# Patient Record
Sex: FEMALE | Race: WHITE | NOT HISPANIC OR LATINO | Employment: FULL TIME | URBAN - NONMETROPOLITAN AREA
[De-identification: names, ages, dates, MRNs, and addresses within clinical notes are randomized per-mention and may not be internally consistent; named-entity substitution may affect disease eponyms.]

---

## 2018-11-27 ENCOUNTER — TRANSCRIBE ORDERS (OUTPATIENT)
Dept: PHYSICAL THERAPY | Facility: CLINIC | Age: 23
End: 2018-11-27

## 2018-11-27 ENCOUNTER — EVALUATION (OUTPATIENT)
Dept: PHYSICAL THERAPY | Facility: CLINIC | Age: 23
End: 2018-11-27
Payer: COMMERCIAL

## 2018-11-27 DIAGNOSIS — M65.9 TENOSYNOVITIS OF FOOT: Primary | ICD-10-CM

## 2018-11-27 DIAGNOSIS — M65.9 TENOSYNOVITIS OF LEFT FOOT: Primary | ICD-10-CM

## 2018-11-27 PROCEDURE — 97162 PT EVAL MOD COMPLEX 30 MIN: CPT | Performed by: PHYSICAL THERAPIST

## 2018-11-27 PROCEDURE — 97535 SELF CARE MNGMENT TRAINING: CPT | Performed by: PHYSICAL THERAPIST

## 2018-11-27 PROCEDURE — G8991 OTHER PT/OT GOAL STATUS: HCPCS | Performed by: PHYSICAL THERAPIST

## 2018-11-27 PROCEDURE — G8990 OTHER PT/OT CURRENT STATUS: HCPCS | Performed by: PHYSICAL THERAPIST

## 2018-11-27 PROCEDURE — 97110 THERAPEUTIC EXERCISES: CPT | Performed by: PHYSICAL THERAPIST

## 2018-11-27 NOTE — LETTER
2018    Wali Galeas Dpm  1101  [employer]  [address] [place] [address] Way [address]    Patient: Pradeep Allan   YOB: 1995   Date of Visit: 2018     Encounter Diagnosis     ICD-10-CM    1  Tenosynovitis of left foot M65 9        Dear Dr Severo Giovanni:    Please review the attached Plan of Care from [facility] recent visit  Please verify that you agree therapy should continue by signing the attached document and sending it back to our office  If you have any questions or concerns, please don't hesitate to call  Sincerely,    Ana Paula Wilkins, PT      Referring Provider:      I certify that I have read the below Plan of Care and certify the need for these services furnished under this plan of treatment while under my care  Wali Galeas Dpm  1101  "[employer] [address] [address]  VIA Facsimile: [phone]          PT Evaluation     Today's date: 2018  Patient name: Pradeep Allan  : 1995  MRN: [id number]  Referring provider: Vinayak Dobbs  Dx:   Encounter Diagnosis     ICD-10-CM    1  Tenosynovitis of left foot M65 9                   Assessment  Assessment details: Patient demonstrates decreased strength, rom, flexibility, balance/gait and increased pain limiting functional ability  Patient would benefit from PT intervention to address limitations in order to maximize functional independence  Impairments: abnormal gait, abnormal or restricted ROM, activity intolerance, impaired balance, impaired physical strength, lacks appropriate home exercise program, pain with function and weight-bearing intolerance    Goals  ST  Initiate HEP  2  Patient to report decreased pain at worst by 50% in 3 weeks    LT  Patient to increase left foot and ankle strength to 5/5 in 6 weeks  2  Patient to increase left foot and ankle rom and flexibility to [facility] in 6 weeks  3  Patient to report decreased pain at worst to 1-2/10 in 6 weeks  4   Patient to return to normal ambulation and balance in 6 weeks  5  Patient to be discharged to [facility] in 6 weeks    Plan  Patient would benefit from: skilled physical therapy  Planned modality interventions: cryotherapy, thermotherapy: hydrocollator packs, unattended electrical stimulation and ultrasound  Planned therapy interventions: joint mobilization, manual therapy, balance, balance/weight bearing training, neuromuscular re-education, patient education, strengthening, stretching, therapeutic activities, therapeutic exercise, therapeutic training, functional ROM exercises, flexibility, graded activity, graded exercise and home exercise program  Frequency: 2x week  Duration in visits: 8  Duration in weeks: 4  Treatment plan discussed with: patient        Subjective Evaluation    History of Present Illness  Date of onset: 8/27/2018  Mechanism of injury: Patient presents to PT with c/o pain in left foot  She reports the pain began about 3 months ago and she was diagnosed with a stress fracture  Patient has seen a foot doctor and she had injections which did not help  Patient reports she was placed in a walking boot for the past 6 weeks and notices improvement  She saw her doctor yesterday and she is to wean out of the walking boot now  Patient denies any known injury and reports the pain is in her 2nd and 3rd metatarsals and phalanges  Patient reports pain worsens when she is standing for longer periods of time  She RTD on December 10th and is now referred to oppt     Pain  At best pain rating: 3  At worst pain rating: 10  Quality: pressure  Relieving factors: relaxation, ice and medications  Aggravating factors: walking, standing and stair climbing    Treatments  Previous treatment: injection treatment  Patient Goals  Patient goals for therapy: decreased pain          Objective     Palpation     Additional Palpation Details  TTP noted at 2nd and 3rd metatarsal and phalanges on dorsal aspect of foot    Neurological Testing Sensation     Ankle/Foot   Left Ankle/Foot   Intact: light touch    Right Ankle/Foot   Intact: light touch     Active Range of Motion   Left Ankle/Foot   Dorsiflexion (ke): 4 degrees   Plantar flexion: WFL  Inversion: WFL  Eversion: WFL    Strength/Myotome Testing     Left Ankle/Foot   Dorsiflexion: 4  Plantar flexion: 4  Inversion: 4-  Eversion: 4-    Ambulation     Observational Gait   Gait: antalgic     Additional Observational Gait Details  Patient ambulating without walking boot this morning             Precautions: None    Daily Treatment Diary     Manual  11/27            Left foot/ankle stretching and PROM                                                                     Exercise Diary  11/27            [address] [facility] L3 10 min            TR/HR             1/4 Squats             BAPS Board             Self Calf Stretch with towel             Ankle ROM             Ankle PRE's with TB             MRE's             Towel Scrunch                                                                                                                                                                Modalities  11/27            CP Prn

## 2018-11-27 NOTE — PROGRESS NOTES
PT Evaluation     Today's date: 2018  Patient name: Stevie Alvarez  : 1995  MRN: [id number]  Referring provider: Myah Zhang  Dx:   Encounter Diagnosis     ICD-10-CM    1  Tenosynovitis of left foot M65 9                   Assessment  Assessment details: Patient demonstrates decreased strength, rom, flexibility, balance/gait and increased pain limiting functional ability  Patient would benefit from PT intervention to address limitations in order to maximize functional independence  Impairments: abnormal gait, abnormal or restricted ROM, activity intolerance, impaired balance, impaired physical strength, lacks appropriate home exercise program, pain with function and weight-bearing intolerance    Goals  ST  Initiate HEP  2  Patient to report decreased pain at worst by 50% in 3 weeks    LT  Patient to increase left foot and ankle strength to 5/5 in 6 weeks  2  Patient to increase left foot and ankle rom and flexibility to [facility] in 6 weeks  3  Patient to report decreased pain at worst to 1-2/10 in 6 weeks  4  Patient to return to normal ambulation and balance in 6 weeks  5   Patient to be discharged to [facility] in 6 weeks    Plan  Patient would benefit from: skilled physical therapy  Planned modality interventions: cryotherapy, thermotherapy: hydrocollator packs, unattended electrical stimulation and ultrasound  Planned therapy interventions: joint mobilization, manual therapy, balance, balance/weight bearing training, neuromuscular re-education, patient education, strengthening, stretching, therapeutic activities, therapeutic exercise, therapeutic training, functional ROM exercises, flexibility, graded activity, graded exercise and home exercise program  Frequency: 2x week  Duration in visits: 8  Duration in weeks: 4  Treatment plan discussed with: patient        Subjective Evaluation    History of Present Illness  Date of onset: 2018  Mechanism of injury: Patient presents to PT with c/o pain in left foot  She reports the pain began about 3 months ago and she was diagnosed with a stress fracture  Patient has seen a foot doctor and she had injections which did not help  Patient reports she was placed in a walking boot for the past 6 weeks and notices improvement  She saw her doctor yesterday and she is to wean out of the walking boot now  Patient denies any known injury and reports the pain is in her 2nd and 3rd metatarsals and phalanges  Patient reports pain worsens when she is standing for longer periods of time  She RTD on December 10th and is now referred to oppt  Pain  At best pain rating: 3  At worst pain rating: 10  Quality: pressure  Relieving factors: relaxation, ice and medications  Aggravating factors: walking, standing and stair climbing    Treatments  Previous treatment: injection treatment  Patient Goals  Patient goals for therapy: decreased pain          Objective     Palpation     Additional Palpation Details  TTP noted at 2nd and 3rd metatarsal and phalanges on dorsal aspect of foot    Neurological Testing     Sensation     Ankle/Foot   Left Ankle/Foot   Intact: light touch    Right Ankle/Foot   Intact: light touch     Active Range of Motion   Left Ankle/Foot   Dorsiflexion (ke): 4 degrees   Plantar flexion: WFL  Inversion: WFL  Eversion: WFL    Strength/Myotome Testing     Left Ankle/Foot   Dorsiflexion: 4  Plantar flexion: 4  Inversion: 4-  Eversion: 4-    Ambulation     Observational Gait   Gait: antalgic     Additional Observational Gait Details  Patient ambulating without walking boot this morning             Precautions: None    Daily Treatment Diary     Manual  11/27            Left foot/ankle stretching and PROM                                                                     Exercise Diary  11/27            [address] [facility] L3 10 min            TR/HR             1/4 Squats             BAPS Board             Self Calf Stretch with towel             Ankle ROM             Ankle PRE's with TB             MRE's             Towel Scrunch                                                                                                                                                                Modalities  11/27            CP Prn

## 2018-11-29 ENCOUNTER — OFFICE VISIT (OUTPATIENT)
Dept: PHYSICAL THERAPY | Facility: CLINIC | Age: 23
End: 2018-11-29
Payer: COMMERCIAL

## 2018-11-29 DIAGNOSIS — M65.9 TENOSYNOVITIS OF LEFT FOOT: Primary | ICD-10-CM

## 2018-11-29 PROCEDURE — 97140 MANUAL THERAPY 1/> REGIONS: CPT | Performed by: PHYSICAL THERAPIST

## 2018-11-29 PROCEDURE — 97110 THERAPEUTIC EXERCISES: CPT | Performed by: PHYSICAL THERAPIST

## 2018-11-29 NOTE — PROGRESS NOTES
Daily Note     Today's date: 2018  Patient name: Nahid Chavez  : 1995  MRN: [id number]  Referring provider: Christine Valadez  Dx:   Encounter Diagnosis     ICD-10-CM    1  Tenosynovitis of left foot M65 9                   Subjective: "The top of my toes are what is hurting the most"  Objective: See treatment diary below  Precautions: None     Daily Treatment Diary      Manual                     Left foot/ankle stretching and PROM   15 min                                                                                                                         Exercise Diary                     [address] [place] [address] L3 10 min L3 10 min                   TR/HR   2x10                   1/4 Squats                       BAPS Board   2x10                   Self Calf Stretch with towel   4x15"                   Ankle ROM   2x10                   Ankle PRE's with TB                       MRE's   10x                   Towel Scrunch   2x10                                                                                                                                                                                                                                                                                                 Modalities                     CP Prn   Declined                                                                         Assessment: Patient with good tolerance to first treatment today  Minimal VC's required for correct performance of therex  Weakness noted t/o left foot and ankle  Plan: Continue per plan of care

## 2018-12-04 ENCOUNTER — OFFICE VISIT (OUTPATIENT)
Dept: PHYSICAL THERAPY | Facility: CLINIC | Age: 23
End: 2018-12-04
Payer: COMMERCIAL

## 2018-12-04 DIAGNOSIS — M65.9 TENOSYNOVITIS OF LEFT FOOT: Primary | ICD-10-CM

## 2018-12-04 PROCEDURE — 97110 THERAPEUTIC EXERCISES: CPT | Performed by: PHYSICAL THERAPIST

## 2018-12-04 PROCEDURE — 97140 MANUAL THERAPY 1/> REGIONS: CPT | Performed by: PHYSICAL THERAPIST

## 2018-12-04 NOTE — PROGRESS NOTES
Daily Note     Today's date: 2018  Patient name: Savannah Xiong  : 1995  MRN: [id number]  Referring provider: Tanika Alberts  Dx:   Encounter Diagnosis     ICD-10-CM    1  Tenosynovitis of left foot M65 9                   Subjective: Patient states "My foot is pretty sore  I am wearing the boot a lot less"  Objective: See treatment diary below  Precautions: None     Daily Treatment Diary      Manual  11/27 11/29 12/3                 Left foot/ankle stretching and PROM   15 min 15 min                                                                                                                       Exercise Diary  11/27 11/29 12/3                 [address] [place] [address] L3 10 min L3 10 min L3 10 min                 TR/HR   2x10 2x10                 1/4 Squats                       BAPS Board   2x10 2x10                 Self Calf Stretch with towel   4x15" 4x15"                 Ankle ROM   2x10 2x10                 Ankle PRE's with TB                       MRE's   10x 2x10                 Towel Scrunch   2x10 2x10                                                                                                                                                                                                                                                                                               Modalities                     CP Prn   Declined                                                                         Assessment: Patient with good tolerance to treatment today  Patient with improved rom noted t/o left ankle and toes  Ankle weakness remains  Plan: Continue per plan of care

## 2018-12-06 ENCOUNTER — OFFICE VISIT (OUTPATIENT)
Dept: PHYSICAL THERAPY | Facility: CLINIC | Age: 23
End: 2018-12-06
Payer: COMMERCIAL

## 2018-12-06 DIAGNOSIS — M65.9 TENOSYNOVITIS OF LEFT FOOT: Primary | ICD-10-CM

## 2018-12-06 PROCEDURE — 97110 THERAPEUTIC EXERCISES: CPT | Performed by: PHYSICAL THERAPIST

## 2018-12-06 PROCEDURE — 97140 MANUAL THERAPY 1/> REGIONS: CPT | Performed by: PHYSICAL THERAPIST

## 2018-12-06 NOTE — PROGRESS NOTES
Daily Note     Today's date: 2018  Patient name: Kelsey Garrett  : 1995  MRN: [id number]  Referring provider: Amauri Flores  Dx:   Encounter Diagnosis     ICD-10-CM    1  Tenosynovitis of left foot M65 9                   Subjective: Patient states "I tried to get out the boot but I had a lot of trouble yesterday"  Objective: See treatment diary below  Precautions: None     Daily Treatment Diary      Manual  11/27 11/29 12/3 12/6               Left foot/ankle stretching and PROM   15 min 15 min 15 min                                                                                                                     Exercise Diary  11/27 11/29 12/3 12/6               [address] [facility] L3 10 min L3 10 min L3 10 min L3 10 min               TR/HR   2x10 2x10 2x10               1/4 Squats                       BAPS Board   2x10 2x10 2x10               Self Calf Stretch with towel   4x15" 4x15" 4x15"               Ankle ROM   2x10 2x10 2x10               Ankle PRE's with TB                       MRE's   10x 2x10 2x10               Towel Scrunch   2x10 2x10 2x10                                                                                                                                                                                                                                                                                             Modalities                     CP Prn   Declined                                                                         Assessment: Patient with good tolerance to treatment today  Flexibility continues to improve  Patient advised to use boot on occasion and as needed as pain and activity level increase  Plan: Continue per plan of care

## 2018-12-11 ENCOUNTER — OFFICE VISIT (OUTPATIENT)
Dept: PHYSICAL THERAPY | Facility: CLINIC | Age: 23
End: 2018-12-11
Payer: COMMERCIAL

## 2018-12-11 DIAGNOSIS — M65.9 TENOSYNOVITIS OF LEFT FOOT: Primary | ICD-10-CM

## 2018-12-11 PROCEDURE — 97140 MANUAL THERAPY 1/> REGIONS: CPT | Performed by: PHYSICAL THERAPIST

## 2018-12-11 PROCEDURE — 97110 THERAPEUTIC EXERCISES: CPT | Performed by: PHYSICAL THERAPIST

## 2018-12-11 PROCEDURE — G8992 OTHER PT/OT  D/C STATUS: HCPCS | Performed by: PHYSICAL THERAPIST

## 2018-12-11 PROCEDURE — G8991 OTHER PT/OT GOAL STATUS: HCPCS | Performed by: PHYSICAL THERAPIST

## 2018-12-11 NOTE — PROGRESS NOTES
Daily Note     Today's date: 2018  Patient name: Javier Finch  : 1995  MRN: [id number]  Referring provider: Rosaura Cote  Dx:   Encounter Diagnosis     ICD-10-CM    1  Tenosynovitis of left foot M65 9                   Subjective: "My foot still gets sore but it isn't as bad as it was  I was at the doctor yesterday and she gave me a steroid pack"  Objective: See treatment diary below  Precautions: None     Daily Treatment Diary      Manual  11/27 11/29 12/3 12/6 12/11             Left foot/ankle stretching and PROM   15 min 15 min 15 min 15 min                                                                                                                   Exercise Diary  11/27 11/29 12/3 12/6 12/11             [address] [facility] L3 10 min L3 10 min L3 10 min L3 10 min L3 10 min             TR/HR   2x10 2x10 2x10 2x10             1/4 Squats                       BAPS Board   2x10 2x10 2x10 2x10             Self Calf Stretch with towel   4x15" 4x15" 4x15" 4x15"             Ankle ROM   2x10 2x10 2x10 2x10             Ankle PRE's with TB                       MRE's   10x 2x10 2x10 2x10             Towel Scrunch   2x10 2x10 2x10 2x10                                                                                                                                                                                                                                                                                           Modalities                     CP Prn   Declined                                                                         Assessment: Patient with good tolerance to treatment today  PT notes improving strength and rom t/o left foot and ankle today  Less tenderness noted over tarsal bones  Plan: Continue per plan of care

## 2018-12-13 ENCOUNTER — APPOINTMENT (OUTPATIENT)
Dept: PHYSICAL THERAPY | Facility: CLINIC | Age: 23
End: 2018-12-13
Payer: COMMERCIAL

## 2019-01-21 NOTE — PROGRESS NOTES
Patient seen for 5 total visits of PT  Last visit 12/11/18  Patient cancelled/DNS for next 2 scheduled appointments  No further contact from patient to reschedule  Patient to be discharged at this time due to script expiration

## 2021-01-03 ENCOUNTER — APPOINTMENT (EMERGENCY)
Dept: RADIOLOGY | Facility: HOSPITAL | Age: 26
End: 2021-01-03
Payer: COMMERCIAL

## 2021-01-03 ENCOUNTER — HOSPITAL ENCOUNTER (EMERGENCY)
Facility: HOSPITAL | Age: 26
Discharge: ANOTHER HOSPITAL | End: 2021-01-03
Attending: EMERGENCY MEDICINE | Admitting: EMERGENCY MEDICINE
Payer: COMMERCIAL

## 2021-01-03 ENCOUNTER — APPOINTMENT (EMERGENCY)
Dept: CT IMAGING | Facility: HOSPITAL | Age: 26
End: 2021-01-03
Payer: COMMERCIAL

## 2021-01-03 VITALS
RESPIRATION RATE: 18 BRPM | OXYGEN SATURATION: 98 % | TEMPERATURE: 97.9 F | HEART RATE: 122 BPM | WEIGHT: 190.26 LBS | SYSTOLIC BLOOD PRESSURE: 132 MMHG | DIASTOLIC BLOOD PRESSURE: 83 MMHG

## 2021-01-03 DIAGNOSIS — S01.111A RIGHT EYELID LACERATION, INITIAL ENCOUNTER: ICD-10-CM

## 2021-01-03 DIAGNOSIS — S02.31XA CLOSED BLOW-OUT FRACTURE OF RIGHT ORBITAL FLOOR (HCC): ICD-10-CM

## 2021-01-03 DIAGNOSIS — S62.102A LEFT WRIST FRACTURE: ICD-10-CM
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
## 2021-01-03 LAB
ANION GAP SERPL CALCULATED.3IONS-SCNC: 11 MMOL/L (ref 4–13)
APTT PPP: 29 SECONDS (ref 23–37)
BASOPHILS # BLD AUTO: 0.07 THOUSANDS/ΜL (ref 0–0.1)
BASOPHILS NFR BLD AUTO: 0 % (ref 0–1)
BILIRUB UR QL STRIP: NEGATIVE
BUN SERPL-MCNC: 12 MG/DL (ref 5–25)
CALCIUM SERPL-MCNC: 8.6 MG/DL (ref 8.3–10.1)
CHLORIDE SERPL-SCNC: 101 MMOL/L (ref 100–108)
CLARITY UR: CLEAR
CO2 SERPL-SCNC: 24 MMOL/L (ref 21–32)
COLOR UR: YELLOW
CREAT SERPL-MCNC: 0.99 MG/DL (ref 0.6–1.3)
EOSINOPHIL # BLD AUTO: 0.21 THOUSAND/ΜL (ref 0–0.61)
EOSINOPHIL NFR BLD AUTO: 1 % (ref 0–6)
ERYTHROCYTE [DISTWIDTH] IN BLOOD BY AUTOMATED COUNT: 12.6 % (ref 11.6–15.1)
EXT PREG TEST URINE: NEGATIVE
EXT. CONTROL ED NAV: NORMAL
GFR SERPL CREATININE-BSD FRML MDRD: 78 ML/MIN/1.73SQ M
GLUCOSE SERPL-MCNC: 96 MG/DL (ref 65–140)
GLUCOSE UR STRIP-MCNC: NEGATIVE MG/DL
HCT VFR BLD AUTO: 42.6 % (ref 34.8–46.1)
HGB BLD-MCNC: 13.9 G/DL (ref 11.5–15.4)
HGB UR QL STRIP.AUTO: NEGATIVE
IMM GRANULOCYTES # BLD AUTO: 0.1 THOUSAND/UL (ref 0–0.2)
IMM GRANULOCYTES NFR BLD AUTO: 1 % (ref 0–2)
INR PPP: 0.97 (ref 0.84–1.19)
KETONES UR STRIP-MCNC: NEGATIVE MG/DL
LEUKOCYTE ESTERASE UR QL STRIP: NEGATIVE
LYMPHOCYTES # BLD AUTO: 4.26 THOUSANDS/ΜL (ref 0.6–4.47)
LYMPHOCYTES NFR BLD AUTO: 24 % (ref 14–44)
MCH RBC QN AUTO: 29.7 PG (ref 26.8–34.3)
MCHC RBC AUTO-ENTMCNC: 32.6 G/DL (ref 31.4–37.4)
MCV RBC AUTO: 91 FL (ref 82–98)
MONOCYTES # BLD AUTO: 0.87 THOUSAND/ΜL (ref 0.17–1.22)
MONOCYTES NFR BLD AUTO: 5 % (ref 4–12)
NEUTROPHILS # BLD AUTO: 12.48 THOUSANDS/ΜL (ref 1.85–7.62)
NEUTS SEG NFR BLD AUTO: 69 % (ref 43–75)
NITRITE UR QL STRIP: NEGATIVE
NRBC BLD AUTO-RTO: 0 /100 WBCS
PH UR STRIP.AUTO: 6 [PH]
PLATELET # BLD AUTO: 341 THOUSANDS/UL (ref 149–390)
PMV BLD AUTO: 9.3 FL (ref 8.9–12.7)
POTASSIUM SERPL-SCNC: 3.7 MMOL/L (ref 3.5–5.3)
PROT UR STRIP-MCNC: NEGATIVE MG/DL
PROTHROMBIN TIME: 12.7 SECONDS (ref 11.6–14.5)
RBC # BLD AUTO: 4.68 MILLION/UL (ref 3.81–5.12)
SODIUM SERPL-SCNC: 136 MMOL/L (ref 136–145)
SP GR UR STRIP.AUTO: <=1.005 (ref 1–1.03)
UROBILINOGEN UR QL STRIP.AUTO: 0.2 E.U./DL
WBC # BLD AUTO: 17.99 THOUSAND/UL (ref 4.31–10.16)

## 2021-01-03 PROCEDURE — 72125 CT NECK SPINE W/O DYE: CPT

## 2021-01-03 PROCEDURE — 25605 CLTX DST RDL FX/EPHYS SEP W/: CPT | Performed by: EMERGENCY MEDICINE

## 2021-01-03 PROCEDURE — 73100 X-RAY EXAM OF WRIST: CPT

## 2021-01-03 PROCEDURE — 96374 THER/PROPH/DIAG INJ IV PUSH: CPT

## 2021-01-03 PROCEDURE — 70450 CT HEAD/BRAIN W/O DYE: CPT

## 2021-01-03 PROCEDURE — 99285 EMERGENCY DEPT VISIT HI MDM: CPT

## 2021-01-03 PROCEDURE — 36415 COLL VENOUS BLD VENIPUNCTURE: CPT | Performed by: EMERGENCY MEDICINE

## 2021-01-03 PROCEDURE — 85025 COMPLETE CBC W/AUTO DIFF WBC: CPT | Performed by: EMERGENCY MEDICINE

## 2021-01-03 PROCEDURE — 71260 CT THORAX DX C+: CPT

## 2021-01-03 PROCEDURE — 85730 THROMBOPLASTIN TIME PARTIAL: CPT | Performed by: EMERGENCY MEDICINE

## 2021-01-03 PROCEDURE — 74177 CT ABD & PELVIS W/CONTRAST: CPT

## 2021-01-03 PROCEDURE — 81025 URINE PREGNANCY TEST: CPT | Performed by: EMERGENCY MEDICINE

## 2021-01-03 PROCEDURE — 73610 X-RAY EXAM OF ANKLE: CPT

## 2021-01-03 PROCEDURE — 70486 CT MAXILLOFACIAL W/O DYE: CPT

## 2021-01-03 PROCEDURE — 80048 BASIC METABOLIC PNL TOTAL CA: CPT | Performed by: EMERGENCY MEDICINE

## 2021-01-03 PROCEDURE — 81003 URINALYSIS AUTO W/O SCOPE: CPT | Performed by: EMERGENCY MEDICINE

## 2021-01-03 PROCEDURE — 85610 PROTHROMBIN TIME: CPT | Performed by: EMERGENCY MEDICINE

## 2021-01-03 PROCEDURE — 99285 EMERGENCY DEPT VISIT HI MDM: CPT | Performed by: EMERGENCY MEDICINE

## 2021-01-03 PROCEDURE — 71045 X-RAY EXAM CHEST 1 VIEW: CPT

## 2021-01-03 RX ORDER — FENTANYL CITRATE 50 UG/ML
100 INJECTION, SOLUTION INTRAMUSCULAR; INTRAVENOUS ONCE
Status: COMPLETED | OUTPATIENT
Start: 2021-01-03 | End: 2021-01-03

## 2021-01-03 RX ADMIN — FENTANYL CITRATE 100 MCG: 50 INJECTION INTRAMUSCULAR; INTRAVENOUS at 00:39

## 2021-01-03 RX ADMIN — IOHEXOL 100 ML: 350 INJECTION, SOLUTION INTRAVENOUS at 01:12

## 2021-01-03 NOTE — TRAUMA DOCUMENTATION
Only personal belongings going with patient is her cell phone  All other belongings including purse and clothing sent home with father
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
## 2021-08-16 ENCOUNTER — APPOINTMENT (EMERGENCY)
Dept: RADIOLOGY | Facility: HOSPITAL | Age: 26
End: 2021-08-16
Payer: COMMERCIAL

## 2021-08-16 ENCOUNTER — HOSPITAL ENCOUNTER (EMERGENCY)
Facility: HOSPITAL | Age: 26
Discharge: HOME | End: 2021-08-16
Attending: EMERGENCY MEDICINE
Payer: COMMERCIAL

## 2021-08-16 VITALS
WEIGHT: 150 LBS | DIASTOLIC BLOOD PRESSURE: 94 MMHG | SYSTOLIC BLOOD PRESSURE: 150 MMHG | OXYGEN SATURATION: 99 % | RESPIRATION RATE: 20 BRPM | BODY MASS INDEX: 23.54 KG/M2 | HEART RATE: 89 BPM | TEMPERATURE: 96.7 F | HEIGHT: 67 IN

## 2021-08-16 DIAGNOSIS — S60.222A CONTUSION OF LEFT HAND, INITIAL ENCOUNTER: Primary | ICD-10-CM

## 2021-08-16 PROCEDURE — 99283 EMERGENCY DEPT VISIT LOW MDM: CPT

## 2021-08-16 PROCEDURE — 73110 X-RAY EXAM OF WRIST: CPT

## 2021-08-16 PROCEDURE — 99284 EMERGENCY DEPT VISIT MOD MDM: CPT | Performed by: EMERGENCY MEDICINE

## 2021-08-16 PROCEDURE — 73130 X-RAY EXAM OF HAND: CPT

## 2021-08-16 NOTE — ED PROVIDER NOTES
History  Chief Complaint   Patient presents with    Hand Injury     Pt hit left hand off cement wall when sleeping two days ago, swelling noted to left hand      Patient with a history of left wrist surgery back in January  Has plates and screws in  States that 2 days ago she hit her left hand against the wall  Complains of continued pain and swelling  Complains of numbness in the fingers  History provided by:  Patient   used: No    Hand Injury  Upper extremity pain location: Left hand  Injury: yes    Time since incident:  2 days  Mechanism of injury comment:  Direct blow  Hit a wall  Pain details:     Quality:  Aching    Radiates to:  Does not radiate    Severity:  Mild    Onset quality:  Gradual    Duration:  2 days    Timing:  Constant    Progression:  Unchanged  Dislocation: no    Foreign body present:  No foreign bodies  Prior injury to area:  Yes (Fractured left wrist in January and needed surgery )  Relieved by:  Nothing  Worsened by: Movement  Ineffective treatments:  None tried  Associated symptoms: numbness and tingling    Associated symptoms: no fatigue, no fever, no muscle weakness and no neck pain        None       History reviewed  No pertinent past medical history  Past Surgical History:   Procedure Laterality Date    FRACTURE SURGERY      TONSILLECTOMY         History reviewed  No pertinent family history  I have reviewed and agree with the history as documented      E-Cigarette/Vaping    E-Cigarette Use Never User      E-Cigarette/Vaping Substances    Nicotine No     THC No     CBD No     Flavoring No     Other No     Unknown No      Social History     Tobacco Use    Smoking status: Current Every Day Smoker     Packs/day: 1 00     Types: Cigarettes    Smokeless tobacco: Never Used   Vaping Use    Vaping Use: Never used   Substance Use Topics    Alcohol use: Yes     Comment: socially     Drug use: Yes     Types: Marijuana     Comment: socially Review of Systems   Constitutional: Negative for chills, fatigue and fever  HENT: Negative for ear pain, hearing loss, sore throat, trouble swallowing and voice change  Eyes: Negative for pain and discharge  Respiratory: Negative for cough, shortness of breath and wheezing  Cardiovascular: Negative for chest pain and palpitations  Gastrointestinal: Negative for abdominal pain, blood in stool, constipation, diarrhea, nausea and vomiting  Genitourinary: Negative for dysuria, flank pain, frequency and hematuria  Musculoskeletal: Negative for joint swelling, neck pain and neck stiffness  Skin: Negative for rash and wound  Neurological: Negative for dizziness, seizures, syncope, facial asymmetry and headaches  Psychiatric/Behavioral: Negative for hallucinations, self-injury and suicidal ideas  All other systems reviewed and are negative  Physical Exam  Physical Exam  Constitutional:       General: She is not in acute distress  Appearance: Normal appearance  She is not ill-appearing  HENT:      Head: Normocephalic and atraumatic  Right Ear: External ear normal       Left Ear: External ear normal       Nose: Nose normal       Mouth/Throat:      Mouth: Mucous membranes are moist    Eyes:      Extraocular Movements: Extraocular movements intact  Pupils: Pupils are equal, round, and reactive to light  Cardiovascular:      Rate and Rhythm: Normal rate and regular rhythm  Pulmonary:      Effort: Pulmonary effort is normal  No respiratory distress  Breath sounds: Normal breath sounds  Abdominal:      General: Abdomen is flat  Bowel sounds are normal  There is no distension  Palpations: Abdomen is soft  Tenderness: There is no abdominal tenderness  Musculoskeletal:         General: No swelling or tenderness  Cervical back: Normal range of motion and neck supple  Comments: Left wrist with a healed surgical scar on the volar surface    Radial pulses 2+   Wrist shows no swelling  No tenderness  Left hand has bruising and tenderness along the 4th and 5th MCP  No obvious bony deformity noted  Skin:     General: Skin is warm and dry  Capillary Refill: Capillary refill takes less than 2 seconds  Neurological:      General: No focal deficit present  Mental Status: She is alert and oriented to person, place, and time  Psychiatric:         Mood and Affect: Mood normal          Behavior: Behavior normal          Vital Signs  ED Triage Vitals [08/16/21 1859]   Temperature Pulse Respirations Blood Pressure SpO2   (!) 96 7 °F (35 9 °C) 89 20 150/94 99 %      Temp Source Heart Rate Source Patient Position - Orthostatic VS BP Location FiO2 (%)   Temporal Monitor -- -- --      Pain Score       7           Vitals:    08/16/21 1859   BP: 150/94   Pulse: 89         Visual Acuity      ED Medications  Medications - No data to display    Diagnostic Studies  Results Reviewed     None                 XR hand 3+ views LEFT   ED Interpretation by Leni Cook MD (08/16 1914)   No fx      XR wrist 3+ views LEFT   ED Interpretation by Leni Cook MD (08/16 1914)   No fx                 Procedures  Procedures         ED Course                                           MDM      Disposition  Final diagnoses:   Contusion of left hand, initial encounter     Time reflects when diagnosis was documented in both MDM as applicable and the Disposition within this note     Time User Action Codes Description Comment    8/16/2021  7:16 PM Alexandra Flynn Add [J70 074A] Contusion of left hand, initial encounter       ED Disposition     ED Disposition Condition Date/Time Comment    Discharge Stable Mon Aug 16, 2021  7:16 PM Debra Richey discharge to home/self care              Follow-up Information     Follow up With Specialties Details Why Contact Info    GINGER Cochran Nurse Practitioner Call in 2 days  [address] [facility] [address]  [phone]            Patient's Medications    No medications on file     No discharge procedures on file      PDMP Review     None          ED Provider  Electronically Signed by           Jordy Medrano MD  08/16/21 4967

## 2021-08-16 NOTE — LETTER
Sudheer Emerson was seen and treated in our emergency department on 8/16/2021  Diagnosis:     Tanisha Reyes  may return to work on return date  She may return on this date: 08/18/2021         If you have any questions or concerns, please don't hesitate to call        Gilma Andrade MD    ______________________________           _______________          _______________  Hospital Representative                              Date                                Time

## 2022-01-11 ENCOUNTER — HOSPITAL ENCOUNTER (EMERGENCY)
Facility: HOSPITAL | Age: 27
Discharge: HOME | End: 2022-01-11
Attending: EMERGENCY MEDICINE | Admitting: EMERGENCY MEDICINE
Payer: COMMERCIAL

## 2022-01-11 ENCOUNTER — APPOINTMENT (EMERGENCY)
Dept: RADIOLOGY | Facility: HOSPITAL | Age: 27
End: 2022-01-11
Payer: COMMERCIAL

## 2022-01-11 VITALS
WEIGHT: 160 LBS | HEART RATE: 110 BPM | BODY MASS INDEX: 25.11 KG/M2 | HEIGHT: 67 IN | OXYGEN SATURATION: 100 % | DIASTOLIC BLOOD PRESSURE: 77 MMHG | RESPIRATION RATE: 20 BRPM | TEMPERATURE: 97.8 F | SYSTOLIC BLOOD PRESSURE: 125 MMHG

## 2022-01-11 DIAGNOSIS — T14.8XXA CONTUSION: ICD-10-CM
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
## 2022-01-11 PROCEDURE — 72100 X-RAY EXAM L-S SPINE 2/3 VWS: CPT

## 2022-01-11 PROCEDURE — 73502 X-RAY EXAM HIP UNI 2-3 VIEWS: CPT

## 2022-01-11 PROCEDURE — 99283 EMERGENCY DEPT VISIT LOW MDM: CPT

## 2022-01-11 PROCEDURE — 99284 EMERGENCY DEPT VISIT MOD MDM: CPT | Performed by: PHYSICIAN ASSISTANT

## 2022-01-11 RX ORDER — ACETAMINOPHEN 325 MG/1
650 TABLET ORAL ONCE
Status: COMPLETED | OUTPATIENT
Start: 2022-01-11 | End: 2022-01-11

## 2022-01-11 RX ORDER — IBUPROFEN 400 MG/1
400 TABLET ORAL ONCE
Status: COMPLETED | OUTPATIENT
Start: 2022-01-11 | End: 2022-01-11

## 2022-01-11 RX ADMIN — IBUPROFEN 400 MG: 400 TABLET ORAL at 19:25

## 2022-01-11 RX ADMIN — ACETAMINOPHEN 650 MG: 325 TABLET ORAL at 21:06

## 2022-01-11 NOTE — Clinical Note
Adelita Samuel was seen and treated in our emergency department on 1/11/2022  Diagnosis:     Haily Beckwith  may return to work on return date  She may return on this date: 01/13/2022         If you have any questions or concerns, please don't hesitate to call        Masood Rothman PA-C    ______________________________           _______________          _______________  Hospital Representative                              Date                                Time

## 2022-01-12 NOTE — ED PROVIDER NOTES
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, stated she fell forward however injured her lower back, hips and legs  Denies headstrike/LOC     27year old female presents to the ED for evaluation of fall  States today while at work she slipped and fell on the ice  States she is unsure how she fell reports she may have landed on her left side  Denies head strike or LOC  States since she has developed pain in her lower back, hips, L>R and states pain radiates into her legs  She denies any loss of bowel or bladder control she denies any weakness, numbness, paresthesias  She denies any fevers or chills  Patient denies any dizziness, confusion, visual changes  She denies any nausea or vomiting  She denies any chest pain, palpitations, difficulty breathing or shortness of breath  Patient denies any abdominal pain  History provided by:  Patient  Fall  Mechanism of injury: fall    Injury location:  Torso and pelvis  Torso injury location:  Back  Pelvic injury location:  L hip  Incident location:  Outdoors  Arrived directly from scene: no    Fall:     Fall occurred:  Walking (ice)    Impact surface:  [place]  Associated symptoms: back pain    Associated symptoms: no abdominal pain, no blindness, no chest pain, no difficulty breathing, no headaches, no hearing loss, no loss of consciousness, no nausea, no neck pain, no seizures and no vomiting        None       History reviewed  No pertinent past medical history  Past Surgical History:   Procedure Laterality Date    FRACTURE SURGERY      TONSILLECTOMY         History reviewed  No pertinent family history  I have reviewed and agree with the history as documented      E-Cigarette/Vaping    E-Cigarette Use Never User      E-Cigarette/Vaping Substances    Nicotine No     THC No     CBD No     Flavoring No     Other No     Unknown No      Social History     Tobacco Use    Smoking status: Current Every Day Smoker     Packs/day: 1 00     Types: Cigarettes    Smokeless tobacco: Never Used   Vaping Use    Vaping Use: Never used   Substance Use Topics    Alcohol use: Yes     Comment: socially     Drug use: Yes     Types: Marijuana     Comment: socially       Review of Systems   HENT: Negative for hearing loss  Eyes: Negative for blindness  Cardiovascular: Negative for chest pain  Gastrointestinal: Negative for abdominal pain, nausea and vomiting  Musculoskeletal: Positive for back pain  Negative for neck pain  Neurological: Negative for seizures, loss of consciousness and headaches  Physical Exam  Physical Exam  Vitals and nursing note reviewed  Constitutional:       General: She is not in acute distress  Appearance: Normal appearance  She is not ill-appearing, toxic-appearing or diaphoretic  HENT:      Head: Normocephalic  Mouth/Throat:      Pharynx: Oropharynx is clear  Eyes:      Conjunctiva/sclera: Conjunctivae normal       Pupils: Pupils are equal, round, and reactive to light  Cardiovascular:      Rate and Rhythm: Normal rate and regular rhythm  Pulses:           Radial pulses are 2+ on the right side and 2+ on the left side  Posterior tibial pulses are 2+ on the right side and 2+ on the left side  Pulmonary:      Effort: Pulmonary effort is normal  No respiratory distress  Breath sounds: Normal breath sounds  No stridor  No wheezing, rhonchi or rales  Chest:      Chest wall: No tenderness  Abdominal:      General: Abdomen is flat  Bowel sounds are normal  There is no distension  Palpations: Abdomen is soft  Tenderness: There is no abdominal tenderness  There is no right CVA tenderness, left CVA tenderness or guarding  Musculoskeletal:      Cervical back: Normal, full passive range of motion without pain and normal range of motion  No tenderness  No spinous process tenderness or muscular tenderness   Normal range of motion  Thoracic back: Normal       Lumbar back: Tenderness and bony tenderness present  Normal range of motion  Back:       Right hip: Normal  No tenderness or bony tenderness  Normal range of motion  Left hip: Normal  No tenderness or bony tenderness  Normal range of motion  Right knee: Normal       Left knee: Normal       Right ankle: Normal       Left ankle: Normal    Neurological:      Mental Status: She is alert  Vital Signs  ED Triage Vitals [01/11/22 1919]   Temperature Pulse Respirations Blood Pressure SpO2   97 8 °F (36 6 °C) (!) 110 20 125/77 100 %      Temp Source Heart Rate Source Patient Position - Orthostatic VS BP Location FiO2 (%)   Temporal Monitor Sitting Right arm --      Pain Score       10 - Worst Possible Pain           Vitals:    01/11/22 1919   BP: 125/77   Pulse: (!) 110   Patient Position - Orthostatic VS: Sitting         Visual Acuity  Visual Acuity      Most Recent Value   L Pupil Size (mm) 3   R Pupil Size (mm) 3          ED Medications  Medications   ibuprofen (MOTRIN) tablet 400 mg (400 mg Oral Given 1/11/22 1925)   acetaminophen (TYLENOL) tablet 650 mg (650 mg Oral Given 1/11/22 2106)       Diagnostic Studies  Results Reviewed     None                 XR spine lumbar 2 or 3 views injury    (Results Pending)   XR hip/pelv 2-3 vws left if performed    (Results Pending)              Procedures  Procedures         ED Course  ED Course as of 01/11/22 2249 Tu Jan 11, 2022 2240 ED interpretation of x-rays negative for acute osseous injury   2240 I discussed results and findings with patient and father  We discussed symptomatic treatment at home and strict return precautions which both verbalized understanding  Patient will follow-up with her occupational health doctor                   MDM  Number of Diagnoses or Management Options  Contusion: new and requires workup  Fall, initial encounter: new and requires workup  Diagnosis management comments: 26 year old female presents to the emergency department for evaluation status post slip and fall on ice at work  In on buttock  Vitals in medical record reviewed  No step-off deformities on exam   No neck tenderness  Patient did have diffuse lower back tenderness mainly on the left side  No bruising, redness, swelling  ED interpretation of L-spine and hip x-rays negative for acute osseous injury  Discussed results and findings with the patient and father  Patient is able to ambulate without difficulty  Normal pulses and reflexes  To follow up with her workman's comp physician and return with any new or worsening symptoms  Patient agreed with this treatment plan  She is clinically hemodynamically stable for discharge       Amount and/or Complexity of Data Reviewed  Tests in the radiology section of CPT®: ordered and reviewed  Review and summarize past medical records: yes  Independent visualization of images, tracings, or specimens: yes        Disposition  Final diagnoses:   Fall, initial encounter   Contusion     Time reflects when diagnosis was documented in both MDM as applicable and the Disposition within this note     Time User Action Codes Description Comment    1/11/2022 10:41 PM Lisbeth Daniel [B16  CPUW] Fall, initial encounter     1/11/2022 10:41 PM Lesley Nails  8XXA] Contusion       ED Disposition     ED Disposition Condition Date/Time Comment    Discharge Stable Tue Jan 11, 2022 10:41 PM Jasbir Case discharge to home/self care  Follow-up Information     Follow up With Specialties Details Why Contact Info    GINGER Robison Nurse Practitioner   [address]  [phone]            Patient's Medications    No medications on file       No discharge procedures on file      PDMP Review     None          ED Provider  Electronically Signed by           Leonila Bran PA-C  01/11/22 5056

## 2022-01-12 NOTE — DISCHARGE INSTRUCTIONS
Please follow-up with your occupational health doctor  Please return with new or worsening symptoms  Please alternate between Tylenol and ibuprofen as needed for pain control

## 2022-07-13 ENCOUNTER — OFFICE VISIT (OUTPATIENT)
Dept: URGENT CARE | Facility: CLINIC | Age: 27
End: 2022-07-13

## 2022-07-13 VITALS
WEIGHT: 183 LBS | DIASTOLIC BLOOD PRESSURE: 73 MMHG | BODY MASS INDEX: 28.72 KG/M2 | RESPIRATION RATE: 20 BRPM | HEIGHT: 67 IN | HEART RATE: 99 BPM | OXYGEN SATURATION: 97 % | TEMPERATURE: 99.1 F | SYSTOLIC BLOOD PRESSURE: 118 MMHG

## 2022-07-13 DIAGNOSIS — R05.9 COUGH: ICD-10-CM

## 2022-07-13 DIAGNOSIS — J02.9 ACUTE PHARYNGITIS, UNSPECIFIED ETIOLOGY: ICD-10-CM

## 2022-07-13 DIAGNOSIS — J40 BRONCHITIS: Primary | ICD-10-CM

## 2022-07-13 DIAGNOSIS — R06.2 WHEEZING: ICD-10-CM

## 2022-07-13 LAB
S PYO AG THROAT QL: NEGATIVE
SARS-COV-2 AG UPPER RESP QL IA: NEGATIVE
VALID CONTROL: NORMAL

## 2022-07-13 PROCEDURE — 94640 AIRWAY INHALATION TREATMENT: CPT | Performed by: PHYSICIAN ASSISTANT

## 2022-07-13 PROCEDURE — 87070 CULTURE OTHR SPECIMN AEROBIC: CPT | Performed by: PHYSICIAN ASSISTANT

## 2022-07-13 PROCEDURE — 87880 STREP A ASSAY W/OPTIC: CPT | Performed by: PHYSICIAN ASSISTANT

## 2022-07-13 PROCEDURE — G0382 LEV 3 HOSP TYPE B ED VISIT: HCPCS | Performed by: PHYSICIAN ASSISTANT

## 2022-07-13 PROCEDURE — 87811 SARS-COV-2 COVID19 W/OPTIC: CPT | Performed by: PHYSICIAN ASSISTANT

## 2022-07-13 RX ORDER — LEVOTHYROXINE SODIUM 0.12 MG/1
125 TABLET ORAL DAILY
COMMUNITY

## 2022-07-13 RX ORDER — DEXTROMETHORPHAN HYDROBROMIDE AND PROMETHAZINE HYDROCHLORIDE 15; 6.25 MG/5ML; MG/5ML
5 SOLUTION ORAL 4 TIMES DAILY PRN
Qty: 180 ML | Refills: 0 | Status: SHIPPED | OUTPATIENT
Start: 2022-07-13

## 2022-07-13 RX ORDER — ALBUTEROL SULFATE 2.5 MG/3ML
2.5 SOLUTION RESPIRATORY (INHALATION) ONCE
Status: COMPLETED | OUTPATIENT
Start: 2022-07-13 | End: 2022-07-13

## 2022-07-13 RX ORDER — PREDNISONE 10 MG/1
TABLET ORAL
Qty: 20 TABLET | Refills: 0 | Status: SHIPPED | OUTPATIENT
Start: 2022-07-13

## 2022-07-13 RX ADMIN — ALBUTEROL SULFATE 2.5 MG: 2.5 SOLUTION RESPIRATORY (INHALATION) at 15:13

## 2022-07-13 NOTE — PATIENT INSTRUCTIONS
Bronchiectasis   AMBULATORY CARE:   Bronchiectasis  is a condition that causes mucus to collect in your airway  Chronic respiratory infections or inflammation cause the bronchi to become thick  Bronchi are larger airways that help carry air in and out of your lungs  Your lungs make mucus to trap and remove germs and irritants that you breathe  Bronchiectasis prevents your lungs from clearing the mucus  This leads to more infections and inflammation, and scarring in your lungs  Signs and symptoms of bronchiectasis:  Periods of active signs and symptoms are called exacerbations  A chronic cough with mucus that may contain blood    Wheezing or crackling, shortness of breath, or trouble breathing    Foul-smelling mucus from your lungs    Weakness and fatigue    Clubbing of your fingers or toes    Chest pain    Call your local emergency number (911 in the [address] [facility]) if:   You have sudden chest pain  You have sudden or more severe trouble breathing  You are confused or feel faint  Call your doctor or pulmonologist if:   Your lips or fingernails turn gray or blue  You cough up blood  You have a fever  You cough more than usual or wheeze  Your medicines do not relieve your symptoms  You have questions or concerns about your condition or care  Treatment  may include any of the following:  Medicines  may help relieve your symptoms  Bronchodilators help open the air passages in your lungs  Antibiotics may be used to treat an infection caused by bacteria  Steroids and some kinds of antibiotics help decrease inflammation in your airway  Medicines may help thin the mucus in your lungs  Thin mucus may be easier to cough up  Oxygen  may be given to help you breathe easier  Oxygen can also decrease the strain on your heart and can help prevent more breathing problems  Surgery  may be used to remove a part of your lung causing your symptoms   You may also need a lung transplant if your symptoms become severe  Surgery may also be needed to stop heavy bleeding in your airway if you cough up a lot of blood  Manage bronchiectasis:   Go to pulmonary rehabilitation (rehab) as directed  Pulmonary rehab is a program that can help you learn how to manage bronchiectasis and prevent exacerbations  Your plan will include aerobic exercise, such as walking, swimming, or riding a bicycle  Regular exercise helps your lungs work well and helps keep your airway clear  Rehab can help you increase your ability to exercise for as long as recommended  Keep your airway clear and open  Do airway clearance techniques as needed  Healthcare providers will show you how to do these in pulmonary rehab  A saline nasal rinse can help clear irritants from your sinuses  Thin mucus by drinking more liquids or using a cool mist humidifier  Keep your head higher than your body when you sleep  If your bed is not adjustable, you can put extra pillows under your upper body to keep your head up  Do not smoke or be around anyone who is smoking  Nicotine and other chemicals in cigarettes and cigars can cause lung damage and make breathing problems worse  Ask your healthcare provider for information if you currently smoke and need help to quit  E-cigarettes or smokeless tobacco still contain nicotine  Talk to your healthcare provider before you use these products  Prevent the spread of germs:       Wash your hands often  Wash your hands several times each day  Wash after you use the bathroom, change a child's diaper, and before you prepare or eat food  Use soap and water every time  Rub your soapy hands together, lacing your fingers  Wash the front and back of your hands, and in between your fingers  Use the fingers of one hand to scrub under the fingernails of the other hand  Wash for at least 20 seconds  Rinse with warm, running water for several seconds  Then dry your hands with a clean towel or paper towel   Use hand  that contains alcohol if soap and water are not available  Do not touch your eyes, nose, or mouth without washing your hands first          Cover a sneeze or cough  Use a tissue that covers your mouth and nose  Throw the tissue away in a trash can right away  Use the bend of your arm if a tissue is not available  Then wash your hands well with soap and water or use a hand   Do not stand close to anyone who is sneezing or coughing  Stay away from anyone who is sick  Avoid crowds as much as possible  Ask anyone who is sick not to come to your home  Ask about vaccines you may need  Vaccines can help prevent some lung infections  Examples include pneumonia, pertussis (whooping cough), diphtheria, and influenza (flu)  Get a flu vaccine every year as soon as recommended, usually starting in September or October  Your healthcare provider will tell you if you need other vaccines, and when to get them  Follow up with your doctor or pulmonologist as directed:  Write down your questions so you remember to ask them during your visits  © Copyright [employer] 2022 Information is for End User's use only and may not be sold, redistributed or otherwise used for commercial purposes  All illustrations and images included in CareNotes® are the copyrighted property of A D A M , Inc  or [facility] Ursula Guerra   The above information is an  only  It is not intended as medical advice for individual conditions or treatments  Talk to your doctor, nurse or pharmacist before following any medical regimen to see if it is safe and effective for you

## 2022-07-13 NOTE — LETTER
July 13, 2022     Patient: Celia Goff   YOB: 1995   Date of Visit: 7/13/2022       To Whom it May Concern:    Astrid Huitron was seen in my clinic on 7/13/2022  She may return to work on 07/14/2022  If you have any questions or concerns, please don't hesitate to call           Sincerely,          Gil Nelson PA-C        CC: No Recipients

## 2022-07-13 NOTE — PROGRESS NOTES
[facility]        NAME: Germán Mackay is a 27 y o  female  : 1995    MRN: [id number]  DATE: 2022  TIME: 3:25 PM    Assessment and Plan   Bronchitis [J40]  1  Bronchitis  predniSONE 10 mg tablet    Promethazine-DM (PHENERGAN-DM) 6 25-15 mg/5 mL oral syrup   2  Wheezing  albuterol inhalation solution 2 5 mg   3  Acute pharyngitis, unspecified etiology  POCT rapid strepA    Throat culture   4  Cough  Poct Covid 19 Rapid Antigen Test         Patient Instructions   Patient Instructions     Bronchiectasis   AMBULATORY CARE:   Bronchiectasis  is a condition that causes mucus to collect in your airway  Chronic respiratory infections or inflammation cause the bronchi to become thick  Bronchi are larger airways that help carry air in and out of your lungs  Your lungs make mucus to trap and remove germs and irritants that you breathe  Bronchiectasis prevents your lungs from clearing the mucus  This leads to more infections and inflammation, and scarring in your lungs  Signs and symptoms of bronchiectasis:  Periods of active signs and symptoms are called exacerbations  · A chronic cough with mucus that may contain blood    · Wheezing or crackling, shortness of breath, or trouble breathing    · Foul-smelling mucus from your lungs    · Weakness and fatigue    · Clubbing of your fingers or toes    · Chest pain    Call your local emergency number (911 in the [address] [place] [address],[address]) if:   · You have sudden chest pain  · You have sudden or more severe trouble breathing  · You are confused or feel faint  Call your doctor or pulmonologist if:   · Your lips or fingernails turn gray or blue  · You cough up blood  · You have a fever  · You cough more than usual or wheeze  · Your medicines do not relieve your symptoms  · You have questions or concerns about your condition or care  Treatment  may include any of the following:  · Medicines  may help relieve your symptoms   Bronchodilators help open the air passages in your lungs  Antibiotics may be used to treat an infection caused by bacteria  Steroids and some kinds of antibiotics help decrease inflammation in your airway  Medicines may help thin the mucus in your lungs  Thin mucus may be easier to cough up  · Oxygen  may be given to help you breathe easier  Oxygen can also decrease the strain on your heart and can help prevent more breathing problems  · Surgery  may be used to remove a part of your lung causing your symptoms  You may also need a lung transplant if your symptoms become severe  Surgery may also be needed to stop heavy bleeding in your airway if you cough up a lot of blood  Manage bronchiectasis:   · Go to pulmonary rehabilitation (rehab) as directed  Pulmonary rehab is a program that can help you learn how to manage bronchiectasis and prevent exacerbations  Your plan will include aerobic exercise, such as walking, swimming, or riding a bicycle  Regular exercise helps your lungs work well and helps keep your airway clear  Rehab can help you increase your ability to exercise for as long as recommended  · Keep your airway clear and open  Do airway clearance techniques as needed  Healthcare providers will show you how to do these in pulmonary rehab  A saline nasal rinse can help clear irritants from your sinuses  Thin mucus by drinking more liquids or using a cool mist humidifier  Keep your head higher than your body when you sleep  If your bed is not adjustable, you can put extra pillows under your upper body to keep your head up  · Do not smoke or be around anyone who is smoking  Nicotine and other chemicals in cigarettes and cigars can cause lung damage and make breathing problems worse  Ask your healthcare provider for information if you currently smoke and need help to quit  E-cigarettes or smokeless tobacco still contain nicotine  Talk to your healthcare provider before you use these products      Prevent the spread of germs:       · Wash your hands often  Wash your hands several times each day  Wash after you use the bathroom, change a child's diaper, and before you prepare or eat food  Use soap and water every time  Rub your soapy hands together, lacing your fingers  Wash the front and back of your hands, and in between your fingers  Use the fingers of one hand to scrub under the fingernails of the other hand  Wash for at least 20 seconds  Rinse with warm, running water for several seconds  Then dry your hands with a clean towel or paper towel  Use hand  that contains alcohol if soap and water are not available  Do not touch your eyes, nose, or mouth without washing your hands first          · Cover a sneeze or cough  Use a tissue that covers your mouth and nose  Throw the tissue away in a trash can right away  Use the bend of your arm if a tissue is not available  Then wash your hands well with soap and water or use a hand   Do not stand close to anyone who is sneezing or coughing  · Stay away from anyone who is sick  Avoid crowds as much as possible  Ask anyone who is sick not to come to your home  · Ask about vaccines you may need  Vaccines can help prevent some lung infections  Examples include pneumonia, pertussis (whooping cough), diphtheria, and influenza (flu)  Get a flu vaccine every year as soon as recommended, usually starting in September or October  Your healthcare provider will tell you if you need other vaccines, and when to get them  Follow up with your doctor or pulmonologist as directed:  Write down your questions so you remember to ask them during your visits  © Copyright [employer] 2022 Information is for End User's use only and may not be sold, redistributed or otherwise used for commercial purposes  All illustrations and images included in CareNotes® are the copyrighted property of [employer]  or Dyllan Fowler  The above information is an  only  It is not intended as medical advice for individual conditions or treatments  Talk to your doctor, nurse or pharmacist before following any medical regimen to see if it is safe and effective for you  Follow up with PCP in 3-5 days  Proceed to  ER if symptoms worsen  Chief Complaint     Chief Complaint   Patient presents with    Sore Throat    Fever    Generalized Body Aches         History of Present Illness       Patient is a 18-year-old female with a past medical history significant for asthma who presents to the clinic for a cough, body aches, sore throat, and chills for 2 days  She states she also has a fever of 101 2  She denies associated nausea or vomiting  She states she is prone to getting bronchitis  She does have of inhaler at home but did not take her inhaler  Review of Systems   Review of Systems   Constitutional: Positive for chills and fever  HENT: Positive for congestion and sore throat  Negative for ear pain, mouth sores, postnasal drip, rhinorrhea and sinus pressure  Eyes: Negative for pain and visual disturbance  Respiratory: Positive for cough  Negative for shortness of breath  Cardiovascular: Negative for chest pain and palpitations  Gastrointestinal: Negative for abdominal pain, nausea and vomiting  Genitourinary: Negative for dysuria and hematuria  Musculoskeletal: Positive for myalgias  Negative for arthralgias and back pain  Skin: Negative for color change and rash  Neurological: Positive for dizziness and headaches  Negative for seizures and syncope  All other systems reviewed and are negative          Current Medications       Current Outpatient Medications:     predniSONE 10 mg tablet, 4 po for 2 days, then 3x2, 2x2, and 1x2, Disp: 20 tablet, Rfl: 0    Promethazine-DM (PHENERGAN-DM) 6 25-15 mg/5 mL oral syrup, Take 5 mL by mouth 4 (four) times a day as needed for cough, Disp: 180 mL, Rfl: 0    levothyroxine 125 mcg tablet, Take 125 mcg by mouth daily, Disp: , Rfl:   No current facility-administered medications for this visit  Current Allergies     Allergies as of 07/13/2022    (No Known Allergies)            The following portions of the patient's history were reviewed and updated as appropriate: allergies, current medications, past family history, past medical history, past social history, past surgical history and problem list      Past Medical History:   Diagnosis Date    Hypothyroidism        Past Surgical History:   Procedure Laterality Date    FRACTURE SURGERY      TONSILLECTOMY         Family History   Problem Relation Age of Onset    Aneurysm Mother     Hypertension Father          Medications have been verified  Objective   /73   Pulse 99   Temp 99 1 °F (37 3 °C)   Resp 20   Ht 5' 7" (1 702 m)   Wt 83 kg (183 lb)   SpO2 97%   BMI 28 66 kg/m²        Physical Exam     Physical Exam  Constitutional:       Appearance: She is well-developed  She is not diaphoretic  HENT:      Head: Normocephalic  Eyes:      General:         Right eye: No discharge  Left eye: No discharge  Pupils: Pupils are equal, round, and reactive to light  Neck:      Thyroid: No thyromegaly  Cardiovascular:      Rate and Rhythm: Normal rate  Heart sounds: No murmur heard  Pulmonary:      Effort: Pulmonary effort is normal  No respiratory distress  Breath sounds: Wheezing present  No rales  Chest:      Chest wall: No tenderness  Abdominal:      General: There is no distension  Palpations: Abdomen is soft  Tenderness: There is no abdominal tenderness  There is no guarding or rebound  Musculoskeletal:         General: Normal range of motion  Cervical back: Normal range of motion  Lymphadenopathy:      Cervical: No cervical adenopathy  Skin:     General: Skin is warm  Neurological:      Mental Status: She is alert and oriented to person, place, and time             Mini neb  Performed by: German Clayton Alexandr Morocho PA-C  Authorized by: Danie Verdin PA-C   Universal Protocol:  Consent: Verbal consent obtained  Consent given by: patient  Patient understanding: patient states understanding of the procedure being performed  Patient identity confirmed: verbally with patient      Number of treatments:  1  Treatment 1:   Pre-Procedure     Symptoms:  Wheezing    Lung Sounds:  Wheezing right upper lung    HR:  99    RR:  20    SP02:  97    Medication Administered:  Albuterol 2 5 mg  Post-Procedure     Symptoms:  Wheezing    Lung sounds:  Decreased wheezing    HR:  80    RR:  20    SP02:  94        The patient's symptoms improved with nebulizer treatment  Rapid strep was negative  Rapid COVID was negative  Symptoms likely viral bronchitis  I will treat supportively with prednisone and cough medicine  The patient will follow-up with primary care doctor or go the ER if symptoms worsen  She does have an inhaler at home I suggested using the inhaler up to twice a day for the next few days

## 2022-07-16 LAB — BACTERIA THROAT CULT: NORMAL

## 2023-01-13 ENCOUNTER — ANESTHESIA EVENT (OUTPATIENT)
Dept: PERIOP | Facility: HOSPITAL | Age: 28
End: 2023-01-13

## 2023-01-13 ENCOUNTER — APPOINTMENT (EMERGENCY)
Dept: CT IMAGING | Facility: HOSPITAL | Age: 28
End: 2023-01-13

## 2023-01-13 ENCOUNTER — ANESTHESIA (OUTPATIENT)
Dept: PERIOP | Facility: HOSPITAL | Age: 28
End: 2023-01-13

## 2023-01-13 ENCOUNTER — HOSPITAL ENCOUNTER (OUTPATIENT)
Facility: HOSPITAL | Age: 28
Setting detail: OUTPATIENT SURGERY
Discharge: HOME | End: 2023-01-14
Attending: STUDENT IN AN ORGANIZED HEALTH CARE EDUCATION/TRAINING PROGRAM | Admitting: STUDENT IN AN ORGANIZED HEALTH CARE EDUCATION/TRAINING PROGRAM

## 2023-01-13 DIAGNOSIS — R10.9 ABDOMINAL PAIN: Primary | ICD-10-CM

## 2023-01-13 DIAGNOSIS — K81.9 CHOLECYSTITIS: ICD-10-CM

## 2023-01-13 PROBLEM — F17.200 SMOKER: Status: ACTIVE | Noted: 2023-01-13

## 2023-01-13 LAB
ALBUMIN SERPL BCP-MCNC: 3.5 G/DL (ref 3.5–5)
ALP SERPL-CCNC: 80 U/L (ref 46–116)
ALT SERPL W P-5'-P-CCNC: 18 U/L (ref 12–78)
ANION GAP SERPL CALCULATED.3IONS-SCNC: 9 MMOL/L (ref 4–13)
AST SERPL W P-5'-P-CCNC: 13 U/L (ref 5–45)
ATRIAL RATE: 77 BPM
BASOPHILS # BLD AUTO: 0.05 THOUSANDS/ÂΜL (ref 0–0.1)
BASOPHILS NFR BLD AUTO: 0 % (ref 0–1)
BILIRUB SERPL-MCNC: 0.39 MG/DL (ref 0.2–1)
BILIRUB UR QL STRIP: NEGATIVE
BUN SERPL-MCNC: 10 MG/DL (ref 5–25)
CALCIUM SERPL-MCNC: 8.5 MG/DL (ref 8.3–10.1)
CHLORIDE SERPL-SCNC: 101 MMOL/L (ref 96–108)
CLARITY UR: CLEAR
CO2 SERPL-SCNC: 24 MMOL/L (ref 21–32)
COLOR UR: YELLOW
CREAT SERPL-MCNC: 0.73 MG/DL (ref 0.6–1.3)
EOSINOPHIL # BLD AUTO: 0.08 THOUSAND/ÂΜL (ref 0–0.61)
EOSINOPHIL NFR BLD AUTO: 1 % (ref 0–6)
ERYTHROCYTE [DISTWIDTH] IN BLOOD BY AUTOMATED COUNT: 13.1 % (ref 11.6–15.1)
EXT PREGNANCY TEST URINE: NEGATIVE
EXT. CONTROL: NORMAL
GFR SERPL CREATININE-BSD FRML MDRD: 111 ML/MIN/1.73SQ M
GLUCOSE SERPL-MCNC: 107 MG/DL (ref 65–140)
GLUCOSE UR STRIP-MCNC: NEGATIVE MG/DL
HCT VFR BLD AUTO: 41.4 % (ref 34.8–46.1)
HGB BLD-MCNC: 13.8 G/DL (ref 11.5–15.4)
HGB UR QL STRIP.AUTO: NEGATIVE
IMM GRANULOCYTES # BLD AUTO: 0.06 THOUSAND/UL (ref 0–0.2)
IMM GRANULOCYTES NFR BLD AUTO: 0 % (ref 0–2)
KETONES UR STRIP-MCNC: NEGATIVE MG/DL
LEUKOCYTE ESTERASE UR QL STRIP: NEGATIVE
LIPASE SERPL-CCNC: 75 U/L (ref 73–393)
LYMPHOCYTES # BLD AUTO: 2.9 THOUSANDS/ÂΜL (ref 0.6–4.47)
LYMPHOCYTES NFR BLD AUTO: 17 % (ref 14–44)
MAGNESIUM SERPL-MCNC: 1.8 MG/DL (ref 1.6–2.6)
MCH RBC QN AUTO: 29.8 PG (ref 26.8–34.3)
MCHC RBC AUTO-ENTMCNC: 33.3 G/DL (ref 31.4–37.4)
MCV RBC AUTO: 89 FL (ref 82–98)
MONOCYTES # BLD AUTO: 0.77 THOUSAND/ÂΜL (ref 0.17–1.22)
MONOCYTES NFR BLD AUTO: 5 % (ref 4–12)
NEUTROPHILS # BLD AUTO: 13.34 THOUSANDS/ÂΜL (ref 1.85–7.62)
NEUTS SEG NFR BLD AUTO: 77 % (ref 43–75)
NITRITE UR QL STRIP: NEGATIVE
NRBC BLD AUTO-RTO: 0 /100 WBCS
P AXIS: 32 DEGREES
PH UR STRIP.AUTO: 7 [PH]
PLATELET # BLD AUTO: 354 THOUSANDS/UL (ref 149–390)
PMV BLD AUTO: 9.2 FL (ref 8.9–12.7)
POTASSIUM SERPL-SCNC: 3.7 MMOL/L (ref 3.5–5.3)
PR INTERVAL: 140 MS
PROT SERPL-MCNC: 6.9 G/DL (ref 6.4–8.4)
PROT UR STRIP-MCNC: NEGATIVE MG/DL
QRS AXIS: 56 DEGREES
QRSD INTERVAL: 82 MS
QT INTERVAL: 384 MS
QTC INTERVAL: 434 MS
RBC # BLD AUTO: 4.63 MILLION/UL (ref 3.81–5.12)
SODIUM SERPL-SCNC: 134 MMOL/L (ref 135–147)
SP GR UR STRIP.AUTO: 1.01 (ref 1–1.03)
T WAVE AXIS: 39 DEGREES
UROBILINOGEN UR QL STRIP.AUTO: 0.2 E.U./DL
VENTRICULAR RATE: 77 BPM
WBC # BLD AUTO: 17.2 THOUSAND/UL (ref 4.31–10.16)

## 2023-01-13 RX ORDER — HYDROMORPHONE HCL/PF 1 MG/ML
0.5 SYRINGE (ML) INJECTION
Status: DISCONTINUED | OUTPATIENT
Start: 2023-01-13 | End: 2023-01-14 | Stop reason: HOSPADM

## 2023-01-13 RX ORDER — ROCURONIUM BROMIDE 10 MG/ML
INJECTION, SOLUTION INTRAVENOUS AS NEEDED
Status: DISCONTINUED | OUTPATIENT
Start: 2023-01-13 | End: 2023-01-13

## 2023-01-13 RX ORDER — SODIUM CHLORIDE, SODIUM LACTATE, POTASSIUM CHLORIDE, CALCIUM CHLORIDE 600; 310; 30; 20 MG/100ML; MG/100ML; MG/100ML; MG/100ML
INJECTION, SOLUTION INTRAVENOUS CONTINUOUS PRN
Status: DISCONTINUED | OUTPATIENT
Start: 2023-01-13 | End: 2023-01-13

## 2023-01-13 RX ORDER — DIPHENHYDRAMINE HYDROCHLORIDE 50 MG/ML
12.5 INJECTION INTRAMUSCULAR; INTRAVENOUS ONCE AS NEEDED
Status: DISCONTINUED | OUTPATIENT
Start: 2023-01-13 | End: 2023-01-14 | Stop reason: HOSPADM

## 2023-01-13 RX ORDER — DEXAMETHASONE SODIUM PHOSPHATE 10 MG/ML
INJECTION, SOLUTION INTRAMUSCULAR; INTRAVENOUS AS NEEDED
Status: DISCONTINUED | OUTPATIENT
Start: 2023-01-13 | End: 2023-01-13

## 2023-01-13 RX ORDER — NICOTINE 21 MG/24HR
1 PATCH, TRANSDERMAL 24 HOURS TRANSDERMAL DAILY
Status: DISCONTINUED | OUTPATIENT
Start: 2023-01-13 | End: 2023-01-14 | Stop reason: HOSPADM

## 2023-01-13 RX ORDER — KETOROLAC TROMETHAMINE 30 MG/ML
INJECTION, SOLUTION INTRAMUSCULAR; INTRAVENOUS AS NEEDED
Status: DISCONTINUED | OUTPATIENT
Start: 2023-01-13 | End: 2023-01-13

## 2023-01-13 RX ORDER — DEXMEDETOMIDINE HYDROCHLORIDE 100 UG/ML
INJECTION, SOLUTION INTRAVENOUS AS NEEDED
Status: DISCONTINUED | OUTPATIENT
Start: 2023-01-13 | End: 2023-01-13

## 2023-01-13 RX ORDER — LIDOCAINE HYDROCHLORIDE 10 MG/ML
INJECTION, SOLUTION EPIDURAL; INFILTRATION; INTRACAUDAL; PERINEURAL AS NEEDED
Status: DISCONTINUED | OUTPATIENT
Start: 2023-01-13 | End: 2023-01-13

## 2023-01-13 RX ORDER — FENTANYL CITRATE/PF 50 MCG/ML
50 SYRINGE (ML) INJECTION
Status: DISCONTINUED | OUTPATIENT
Start: 2023-01-13 | End: 2023-01-14 | Stop reason: HOSPADM

## 2023-01-13 RX ORDER — METRONIDAZOLE 500 MG/100ML
500 INJECTION, SOLUTION INTRAVENOUS EVERY 8 HOURS
Status: DISCONTINUED | OUTPATIENT
Start: 2023-01-13 | End: 2023-01-13

## 2023-01-13 RX ORDER — LIDOCAINE HYDROCHLORIDE 10 MG/ML
0.5 INJECTION, SOLUTION EPIDURAL; INFILTRATION; INTRACAUDAL; PERINEURAL ONCE AS NEEDED
Status: DISCONTINUED | OUTPATIENT
Start: 2023-01-13 | End: 2023-01-13

## 2023-01-13 RX ORDER — ONDANSETRON 2 MG/ML
4 INJECTION INTRAMUSCULAR; INTRAVENOUS ONCE AS NEEDED
Status: DISCONTINUED | OUTPATIENT
Start: 2023-01-13 | End: 2023-01-14 | Stop reason: CLARIF

## 2023-01-13 RX ORDER — HYDROMORPHONE HCL/PF 1 MG/ML
SYRINGE (ML) INJECTION AS NEEDED
Status: DISCONTINUED | OUTPATIENT
Start: 2023-01-13 | End: 2023-01-13

## 2023-01-13 RX ORDER — FENTANYL CITRATE 50 UG/ML
INJECTION, SOLUTION INTRAMUSCULAR; INTRAVENOUS AS NEEDED
Status: DISCONTINUED | OUTPATIENT
Start: 2023-01-13 | End: 2023-01-13

## 2023-01-13 RX ORDER — PROPOFOL 10 MG/ML
INJECTION, EMULSION INTRAVENOUS AS NEEDED
Status: DISCONTINUED | OUTPATIENT
Start: 2023-01-13 | End: 2023-01-13

## 2023-01-13 RX ORDER — CEFAZOLIN SODIUM 2 G/50ML
2000 SOLUTION INTRAVENOUS EVERY 8 HOURS
Status: DISCONTINUED | OUTPATIENT
Start: 2023-01-13 | End: 2023-01-13

## 2023-01-13 RX ORDER — ONDANSETRON 2 MG/ML
4 INJECTION INTRAMUSCULAR; INTRAVENOUS EVERY 6 HOURS PRN
Status: DISCONTINUED | OUTPATIENT
Start: 2023-01-13 | End: 2023-01-14 | Stop reason: HOSPADM

## 2023-01-13 RX ORDER — CEFAZOLIN SODIUM 2 G/50ML
2000 SOLUTION INTRAVENOUS
Status: COMPLETED | OUTPATIENT
Start: 2023-01-13 | End: 2023-01-13

## 2023-01-13 RX ORDER — MIDAZOLAM HYDROCHLORIDE 2 MG/2ML
INJECTION, SOLUTION INTRAMUSCULAR; INTRAVENOUS AS NEEDED
Status: DISCONTINUED | OUTPATIENT
Start: 2023-01-13 | End: 2023-01-13

## 2023-01-13 RX ORDER — BUPIVACAINE HYDROCHLORIDE AND EPINEPHRINE 2.5; 5 MG/ML; UG/ML
INJECTION, SOLUTION INFILTRATION; PERINEURAL AS NEEDED
Status: DISCONTINUED | OUTPATIENT
Start: 2023-01-13 | End: 2023-01-13 | Stop reason: HOSPADM

## 2023-01-13 RX ORDER — CEFTRIAXONE 1 G/50ML
1000 INJECTION, SOLUTION INTRAVENOUS ONCE
Status: COMPLETED | OUTPATIENT
Start: 2023-01-13 | End: 2023-01-13

## 2023-01-13 RX ORDER — KETOROLAC TROMETHAMINE 30 MG/ML
15 INJECTION, SOLUTION INTRAMUSCULAR; INTRAVENOUS ONCE
Status: COMPLETED | OUTPATIENT
Start: 2023-01-13 | End: 2023-01-13

## 2023-01-13 RX ORDER — MORPHINE SULFATE 4 MG/ML
4 INJECTION, SOLUTION INTRAMUSCULAR; INTRAVENOUS EVERY 4 HOURS PRN
Status: DISCONTINUED | OUTPATIENT
Start: 2023-01-13 | End: 2023-01-14 | Stop reason: HOSPADM

## 2023-01-13 RX ORDER — SODIUM CHLORIDE 9 MG/ML
125 INJECTION, SOLUTION INTRAVENOUS CONTINUOUS
Status: DISCONTINUED | OUTPATIENT
Start: 2023-01-13 | End: 2023-01-14 | Stop reason: HOSPADM

## 2023-01-13 RX ORDER — IBUPROFEN 600 MG/1
600 TABLET ORAL EVERY 6 HOURS PRN
Status: DISCONTINUED | OUTPATIENT
Start: 2023-01-13 | End: 2023-01-14 | Stop reason: HOSPADM

## 2023-01-13 RX ORDER — HYDROMORPHONE HCL IN WATER/PF 6 MG/30 ML
0.2 PATIENT CONTROLLED ANALGESIA SYRINGE INTRAVENOUS
Status: DISCONTINUED | OUTPATIENT
Start: 2023-01-13 | End: 2023-01-14 | Stop reason: HOSPADM

## 2023-01-13 RX ORDER — ONDANSETRON 2 MG/ML
INJECTION INTRAMUSCULAR; INTRAVENOUS AS NEEDED
Status: DISCONTINUED | OUTPATIENT
Start: 2023-01-13 | End: 2023-01-13

## 2023-01-13 RX ORDER — ONDANSETRON 2 MG/ML
4 INJECTION INTRAMUSCULAR; INTRAVENOUS ONCE
Status: COMPLETED | OUTPATIENT
Start: 2023-01-13 | End: 2023-01-13

## 2023-01-13 RX ORDER — SODIUM CHLORIDE 9 MG/ML
INJECTION, SOLUTION INTRAVENOUS AS NEEDED
Status: DISCONTINUED | OUTPATIENT
Start: 2023-01-13 | End: 2023-01-13 | Stop reason: HOSPADM

## 2023-01-13 RX ORDER — SODIUM CHLORIDE, SODIUM GLUCONATE, SODIUM ACETATE, POTASSIUM CHLORIDE, MAGNESIUM CHLORIDE, SODIUM PHOSPHATE, DIBASIC, AND POTASSIUM PHOSPHATE .53; .5; .37; .037; .03; .012; .00082 G/100ML; G/100ML; G/100ML; G/100ML; G/100ML; G/100ML; G/100ML
1000 INJECTION, SOLUTION INTRAVENOUS ONCE
Status: COMPLETED | OUTPATIENT
Start: 2023-01-13 | End: 2023-01-13

## 2023-01-13 RX ORDER — METOCLOPRAMIDE HYDROCHLORIDE 5 MG/ML
10 INJECTION INTRAMUSCULAR; INTRAVENOUS ONCE AS NEEDED
Status: DISCONTINUED | OUTPATIENT
Start: 2023-01-13 | End: 2023-01-14 | Stop reason: HOSPADM

## 2023-01-13 RX ORDER — LEVOTHYROXINE SODIUM 0.12 MG/1
125 TABLET ORAL DAILY
Status: DISCONTINUED | OUTPATIENT
Start: 2023-01-14 | End: 2023-01-14 | Stop reason: HOSPADM

## 2023-01-13 RX ADMIN — SODIUM CHLORIDE, SODIUM GLUCONATE, SODIUM ACETATE, POTASSIUM CHLORIDE, MAGNESIUM CHLORIDE, SODIUM PHOSPHATE, DIBASIC, AND POTASSIUM PHOSPHATE 1000 ML: .53; .5; .37; .037; .03; .012; .00082 INJECTION, SOLUTION INTRAVENOUS at 04:08

## 2023-01-13 RX ADMIN — FENTANYL CITRATE 50 MCG: 50 INJECTION INTRAMUSCULAR; INTRAVENOUS at 14:15

## 2023-01-13 RX ADMIN — ROCURONIUM BROMIDE 20 MG: 10 INJECTION, SOLUTION INTRAVENOUS at 13:12

## 2023-01-13 RX ADMIN — IBUPROFEN 600 MG: 600 TABLET ORAL at 15:10

## 2023-01-13 RX ADMIN — KETOROLAC TROMETHAMINE 15 MG: 30 INJECTION, SOLUTION INTRAMUSCULAR at 04:11

## 2023-01-13 RX ADMIN — LIDOCAINE HYDROCHLORIDE 50 MG: 10 INJECTION, SOLUTION EPIDURAL; INFILTRATION; INTRACAUDAL; PERINEURAL at 12:11

## 2023-01-13 RX ADMIN — NICOTINE 1 PATCH: 21 PATCH, EXTENDED RELEASE TRANSDERMAL at 14:58

## 2023-01-13 RX ADMIN — DEXMEDETOMIDINE HCL 8 MCG: 100 INJECTION INTRAVENOUS at 12:37

## 2023-01-13 RX ADMIN — FENTANYL CITRATE 50 MCG: 50 INJECTION INTRAMUSCULAR; INTRAVENOUS at 12:32

## 2023-01-13 RX ADMIN — CEFTRIAXONE 1000 MG: 1 INJECTION, SOLUTION INTRAVENOUS at 08:10

## 2023-01-13 RX ADMIN — SODIUM CHLORIDE, SODIUM LACTATE, POTASSIUM CHLORIDE, AND CALCIUM CHLORIDE: .6; .31; .03; .02 INJECTION, SOLUTION INTRAVENOUS at 12:07

## 2023-01-13 RX ADMIN — SODIUM CHLORIDE 125 ML/HR: 0.9 INJECTION, SOLUTION INTRAVENOUS at 15:00

## 2023-01-13 RX ADMIN — IBUPROFEN 600 MG: 600 TABLET ORAL at 22:09

## 2023-01-13 RX ADMIN — DEXMEDETOMIDINE HCL 12 MCG: 100 INJECTION INTRAVENOUS at 12:11

## 2023-01-13 RX ADMIN — MORPHINE SULFATE 4 MG: 4 INJECTION INTRAVENOUS at 17:21

## 2023-01-13 RX ADMIN — PROPOFOL 150 MG: 10 INJECTION, EMULSION INTRAVENOUS at 12:11

## 2023-01-13 RX ADMIN — FENTANYL CITRATE 100 MCG: 50 INJECTION INTRAMUSCULAR; INTRAVENOUS at 12:11

## 2023-01-13 RX ADMIN — KETOROLAC TROMETHAMINE 30 MG: 30 INJECTION, SOLUTION INTRAMUSCULAR; INTRAVENOUS at 13:13

## 2023-01-13 RX ADMIN — SODIUM CHLORIDE 125 ML/HR: 0.9 INJECTION, SOLUTION INTRAVENOUS at 22:49

## 2023-01-13 RX ADMIN — SUGAMMADEX 300 MG: 100 INJECTION, SOLUTION INTRAVENOUS at 13:26

## 2023-01-13 RX ADMIN — CEFAZOLIN SODIUM 2000 MG: 2 SOLUTION INTRAVENOUS at 12:08

## 2023-01-13 RX ADMIN — DEXMEDETOMIDINE HCL 12 MCG: 100 INJECTION INTRAVENOUS at 12:29

## 2023-01-13 RX ADMIN — FENTANYL CITRATE 50 MCG: 50 INJECTION INTRAMUSCULAR; INTRAVENOUS at 12:38

## 2023-01-13 RX ADMIN — MIDAZOLAM 2 MG: 1 INJECTION INTRAMUSCULAR; INTRAVENOUS at 12:05

## 2023-01-13 RX ADMIN — ROCURONIUM BROMIDE 50 MG: 10 INJECTION, SOLUTION INTRAVENOUS at 12:11

## 2023-01-13 RX ADMIN — ONDANSETRON 4 MG: 2 INJECTION INTRAMUSCULAR; INTRAVENOUS at 13:13

## 2023-01-13 RX ADMIN — SODIUM CHLORIDE, SODIUM LACTATE, POTASSIUM CHLORIDE, AND CALCIUM CHLORIDE: .6; .31; .03; .02 INJECTION, SOLUTION INTRAVENOUS at 13:15

## 2023-01-13 RX ADMIN — DEXMEDETOMIDINE HCL 8 MCG: 100 INJECTION INTRAVENOUS at 12:45

## 2023-01-13 RX ADMIN — HYDROMORPHONE HYDROCHLORIDE 0.5 MG: 1 INJECTION, SOLUTION INTRAMUSCULAR; INTRAVENOUS; SUBCUTANEOUS at 13:30

## 2023-01-13 RX ADMIN — ONDANSETRON 4 MG: 2 INJECTION INTRAMUSCULAR; INTRAVENOUS at 04:10

## 2023-01-13 RX ADMIN — ONDANSETRON 4 MG: 2 INJECTION INTRAMUSCULAR; INTRAVENOUS at 17:40

## 2023-01-13 RX ADMIN — FENTANYL CITRATE 50 MCG: 50 INJECTION INTRAMUSCULAR; INTRAVENOUS at 14:11

## 2023-01-13 RX ADMIN — DEXAMETHASONE SODIUM PHOSPHATE 10 MG: 10 INJECTION, SOLUTION INTRAMUSCULAR; INTRAVENOUS at 12:11

## 2023-01-13 RX ADMIN — IOHEXOL 100 ML: 350 INJECTION, SOLUTION INTRAVENOUS at 06:14

## 2023-01-13 RX ADMIN — METRONIDAZOLE 500 MG: 500 INJECTION, SOLUTION INTRAVENOUS at 08:42

## 2023-01-13 NOTE — ANESTHESIA POSTPROCEDURE EVALUATION
Post-Op Assessment Note    CV Status:  Stable  Pain Score: 0    Pain management: adequate     Mental Status:  Awake and sleepy   Hydration Status:  Stable   PONV Controlled:  Controlled   Airway Patency:  Patent      Post Op Vitals Reviewed: Yes      Staff: CRNA         No notable events documented      BP   99/56   Temp   98 6   Pulse  98   Resp   21   SpO2 100

## 2023-01-13 NOTE — H&P
H&P - General Surgery   Maine Vasques 32 y o  female MRN: [id number]  Unit/Bed#: ED 03 Encounter: [id number]  Assessment:  32 y o  female with right sided abdominal pain, biliary colic vs  cholecystitis  CTAP pertinent IMPRESSION:  - Cholelithiasis within the mildly distended gallbladder, gallbladder wall appears mildly thickened, consider biliary colic versus acute cholecystitis depending on the clinical setting     - Afebrile, VSS on room air  - Leukocytosis - 17 20  - Hgb stable 13 8  - Hyponatremia 134  - LFTs normal, Tbili 0 39  - Hx psoriasis, left arm with plates and screws  - Everyday smoker, occasional marijuana use    Plan:  - Discussed case with patient, father, and Dr Rosaura Granados  Based on tenderness of the RUQ, WBC, and CT images will move forward with laparoscopic cholecystectomy for today  - NPO  - IVF  - Ancef on call to OR  - Pain/nausea control PRN  - Nicotine patch ordered  - SCDs  - OOB ad millie    History of Present Illness   Chief Complaint: Right sided abdominal pain     HPI:  Maine Vasques is a 32 y o  female with past medical history significant for psoriasis, prior fracture of left arm with plates and screws, tobacco use who initially presented to [address] [place] [address] ED with complaints of back pain  Patient explains pain began in the back last night but then moved forward into the abdomen  Initially she points to RLQ as main area of pain but also feels this in RUQ  Patient denies any association with food or prior episodes of same  She did have some associated nausea and vomiting  She last ate last evening around 7PM  She denies fevers, chills, chest pain, any issues with BM or urination  Patient did experience some shortness of breath with extreme pain  She admits to being everyday smoker and occasional marijuana use  Review of Systems   Constitutional: Positive for appetite change (improved since recieving pain meds)  Negative for activity change, chills and fever     HENT: Negative for congestion, rhinorrhea, sinus pressure and sore throat  Respiratory: Positive for shortness of breath (with extreme pain)  Negative for chest tightness  Cardiovascular: Negative for chest pain and palpitations  Gastrointestinal: Positive for abdominal pain (of entire right side), nausea and vomiting  Negative for abdominal distention, blood in stool, constipation and diarrhea  Genitourinary: Negative for difficulty urinating, dysuria, frequency and urgency  Musculoskeletal: Positive for back pain (initially presented due to back pain, centralized across mid back)  Neurological: Negative for dizziness, weakness, light-headedness and headaches  Psychiatric/Behavioral: Negative  Historical Information   Past Medical History:   Diagnosis Date   • Hypothyroidism    • Psoriasis      Past Surgical History:   Procedure Laterality Date   • FRACTURE SURGERY     • TONSILLECTOMY       Social History   Social History     Substance and Sexual Activity   Alcohol Use Yes    Comment: socially      Social History     Substance and Sexual Activity   Drug Use Yes   • Types: Marijuana    Comment: socially     Social History     Tobacco Use   Smoking Status Every Day   • Packs/day: 1 00   • Types: Cigarettes   Smokeless Tobacco Never     E-Cigarette/Vaping   • E-Cigarette Use Never User      E-Cigarette/Vaping Substances   • Nicotine No    • THC No    • CBD No    • Flavoring No    • Other No    • Unknown No      Family History: non-contributory    Meds/Allergies   PTA meds:   Prior to Admission Medications   Prescriptions Last Dose Informant Patient Reported? Taking?    Promethazine-DM (PHENERGAN-DM) 6 25-15 mg/5 mL oral syrup   No No   Sig: Take 5 mL by mouth 4 (four) times a day as needed for cough   levothyroxine 125 mcg tablet   Yes No   Sig: Take 125 mcg by mouth daily   predniSONE 10 mg tablet   No No   Si po for 2 days, then 3x2, 2x2, and 1x2      Facility-Administered Medications: None     No Known Allergies    Objective   First Vitals:   Blood Pressure: 134/75 (01/13/23 0339)  Pulse: 76 (01/13/23 0339)  Temperature: 97 6 °F (36 4 °C) (01/13/23 0339)  Temp Source: Temporal (01/13/23 0339)  Respirations: 16 (01/13/23 0339)  Weight - Scale: 76 kg (167 lb 8 8 oz) (01/13/23 0339)  SpO2: 99 % (01/13/23 0339)    Current Vitals:   Blood Pressure: 115/70 (01/13/23 0726)  Pulse: 72 (01/13/23 0726)  Temperature: 97 6 °F (36 4 °C) (01/13/23 0339)  Temp Source: Temporal (01/13/23 0339)  Respirations: 17 (01/13/23 0726)  Weight - Scale: 76 kg (167 lb 8 8 oz) (01/13/23 0339)  SpO2: 98 % (01/13/23 0726)      Intake/Output Summary (Last 24 hours) at 1/13/2023 0839  Last data filed at 1/13/2023 7217  Gross per 24 hour   Intake 1000 ml   Output --   Net 1000 ml       Invasive Devices     Peripheral Intravenous Line  Duration           Peripheral IV 01/13/23 Left;Lateral Antecubital <1 day                Physical Exam  Constitutional:       General: She is not in acute distress  Appearance: Normal appearance  HENT:      Head: Normocephalic and atraumatic  Right Ear: External ear normal       Left Ear: External ear normal       Nose: Nose normal       Mouth/Throat:      Pharynx: Oropharynx is clear  Eyes:      Extraocular Movements: Extraocular movements intact  Cardiovascular:      Rate and Rhythm: Normal rate  Pulmonary:      Effort: Pulmonary effort is normal  No respiratory distress  Abdominal:      General: Abdomen is flat  There is no distension  Palpations: Abdomen is soft  Tenderness: There is abdominal tenderness (to palpation of right side)  There is no guarding or rebound  Negative signs include Kiser's sign  Musculoskeletal:         General: Normal range of motion  Cervical back: Normal range of motion  Skin:     General: Skin is warm and dry  Capillary Refill: Capillary refill takes less than 2 seconds        Comments: Does comment on some swelling and pain of right toe - noted, swollen, no skin changes/erythema   Neurological:      General: No focal deficit present  Mental Status: She is alert  Psychiatric:         Mood and Affect: Mood normal          Behavior: Behavior normal      Lab Results:   I have personally reviewed pertinent lab results  , CBC:   Lab Results   Component Value Date    WBC 17 20 (H) 01/13/2023    HGB 13 8 01/13/2023    HCT 41 4 01/13/2023    MCV 89 01/13/2023     01/13/2023    MCH 29 8 01/13/2023    MCHC 33 3 01/13/2023    RDW 13 1 01/13/2023    MPV 9 2 01/13/2023    NRBC 0 01/13/2023   , CMP:   Lab Results   Component Value Date    SODIUM 134 (L) 01/13/2023    K 3 7 01/13/2023     01/13/2023    CO2 24 01/13/2023    BUN 10 01/13/2023    CREATININE 0 73 01/13/2023    CALCIUM 8 5 01/13/2023    AST 13 01/13/2023    ALT 18 01/13/2023    ALKPHOS 80 01/13/2023    EGFR 111 01/13/2023     Imaging: I have personally reviewed pertinent reports  EKG, Pathology, and Other Studies: I have personally reviewed pertinent reports  Code Status: No Order  Advance Directive and Living Will:      Power of :    POLST:      Counseling / Coordination of Care  Total floor / unit time spent today 40 minutes  Greater than 50% of total time was spent with the patient and / or family counseling and / or coordination of care  A description of the counseling / coordination of care: review of chart, labs, prior notes, imaging, discussion with team, history and physical from patient, assessment, discussion of plan with patient/father        Kelsi Hill Billig  1/13/2023

## 2023-01-13 NOTE — ED PROVIDER NOTES
History  Chief Complaint   Patient presents with   • Back Pain     Started earlier today and now has back, chest pain and abdominal pain  Patient states she vomited x 3  History provided by:  Patient  Abdominal Pain  Pain location:  Suprapubic  Pain quality: sharp    Pain radiates to:  Back  Pain severity:  Severe  Onset quality:  Gradual  Duration:  9 hours  Timing:  Constant  Progression:  Worsening  Chronicity:  New  Context comment:  Devloped lower back pain yesterday AM which progressed to lower abd pain last PM  +N/V x 3 last night  Denies susp food intake, sick contacts  Relieved by:  Nothing  Worsened by:  Nothing  Ineffective treatments:  Acetaminophen  Associated symptoms: chills, fatigue, nausea and vomiting    Associated symptoms: no chest pain, no cough, no diarrhea, no dysuria, no fever, no shortness of breath, no sore throat, no vaginal bleeding and no vaginal discharge      Prior to Admission Medications   Prescriptions Last Dose Informant Patient Reported? Taking? Promethazine-DM (PHENERGAN-DM) 6 25-15 mg/5 mL oral syrup   No No   Sig: Take 5 mL by mouth 4 (four) times a day as needed for cough   levothyroxine 125 mcg tablet   Yes No   Sig: Take 125 mcg by mouth daily   predniSONE 10 mg tablet   No No   Si po for 2 days, then 3x2, 2x2, and 1x2      Facility-Administered Medications: None       Past Medical History:   Diagnosis Date   • Hypothyroidism    • Psoriasis        Past Surgical History:   Procedure Laterality Date   • FRACTURE SURGERY     • TONSILLECTOMY         Family History   Problem Relation Age of Onset   • Aneurysm Mother    • Hypertension Father      I have reviewed and agree with the history as documented      E-Cigarette/Vaping   • E-Cigarette Use Never User      E-Cigarette/Vaping Substances   • Nicotine No    • THC No    • CBD No    • Flavoring No    • Other No    • Unknown No      Social History     Tobacco Use   • Smoking status: Every Day     Packs/day: 1 00 Types: Cigarettes   • Smokeless tobacco: Never   Vaping Use   • Vaping Use: Never used   Substance Use Topics   • Alcohol use: Yes     Comment: socially    • Drug use: Yes     Types: Marijuana     Comment: socially     Review of Systems   Constitutional: Positive for activity change, appetite change, chills and fatigue  Negative for fever  HENT: Negative for congestion, rhinorrhea, sinus pressure, sinus pain and sore throat  Eyes: Negative for photophobia, discharge, redness and visual disturbance  Respiratory: Negative for cough, chest tightness, shortness of breath and wheezing  Cardiovascular: Negative for chest pain and palpitations  Gastrointestinal: Positive for abdominal pain, nausea and vomiting  Negative for abdominal distention and diarrhea  Genitourinary: Positive for pelvic pain  Negative for decreased urine volume, difficulty urinating, dysuria, flank pain, frequency, urgency, vaginal bleeding, vaginal discharge and vaginal pain  Musculoskeletal: Positive for back pain  Negative for arthralgias, myalgias, neck pain and neck stiffness  Skin: Negative for color change, pallor, rash and wound  Neurological: Negative for dizziness, syncope, weakness, light-headedness and headaches  Hematological: Does not bruise/bleed easily  Psychiatric/Behavioral: Negative for confusion  All other systems reviewed and are negative  Physical Exam  Physical Exam  Vitals and nursing note reviewed  Constitutional:       General: She is in acute distress  Appearance: She is not ill-appearing or toxic-appearing  HENT:      Head: Normocephalic and atraumatic  Right Ear: External ear normal       Left Ear: External ear normal       Nose: No congestion or rhinorrhea  Eyes:      General: No scleral icterus  Right eye: No discharge  Left eye: No discharge  Extraocular Movements: Extraocular movements intact        Conjunctiva/sclera: Conjunctivae normal  Cardiovascular:      Rate and Rhythm: Normal rate and regular rhythm  Pulses: Normal pulses  Heart sounds: Normal heart sounds  No murmur heard  Pulmonary:      Effort: Pulmonary effort is normal  No respiratory distress  Breath sounds: Normal breath sounds  No stridor  No wheezing, rhonchi or rales  Chest:      Chest wall: No tenderness  Abdominal:      General: Abdomen is flat  Bowel sounds are normal  There is no distension  Palpations: Abdomen is soft  Tenderness: There is abdominal tenderness in the epigastric area, suprapubic area and left lower quadrant  There is no right CVA tenderness, left CVA tenderness, guarding or rebound  Hernia: No hernia is present  Comments: TTP along the suprapubic/LLQ; epigastric abdomen  No rebound or guarding  Normoactive bowel sounds  Skin:     General: Skin is warm and dry  Capillary Refill: Capillary refill takes less than 2 seconds  Coloration: Skin is not jaundiced or pale  Findings: No bruising, erythema, lesion or rash  Neurological:      General: No focal deficit present  Mental Status: She is alert and oriented to person, place, and time  Mental status is at baseline  Cranial Nerves: No cranial nerve deficit  Sensory: No sensory deficit  Motor: No weakness  Psychiatric:         Mood and Affect: Mood normal          Behavior: Behavior normal          Thought Content:  Thought content normal          Judgment: Judgment normal        Vital Signs  ED Triage Vitals [01/13/23 0339]   Temperature Pulse Respirations Blood Pressure SpO2   97 6 °F (36 4 °C) 76 16 134/75 99 %      Temp Source Heart Rate Source Patient Position - Orthostatic VS BP Location FiO2 (%)   Temporal Monitor Lying Right arm --      Pain Score       7           Vitals:    01/13/23 0339   BP: 134/75   Pulse: 76   Patient Position - Orthostatic VS: Lying     ED Medications  Medications   multi-electrolyte (ISOLYTE-S PH 7 4) bolus 1,000 mL (0 mL Intravenous Stopped 1/13/23 0527)   ondansetron (ZOFRAN) injection 4 mg (4 mg Intravenous Given 1/13/23 0410)   ketorolac (TORADOL) injection 15 mg (15 mg Intravenous Given 1/13/23 0411)   iohexol (OMNIPAQUE) 350 MG/ML injection (SINGLE-DOSE) 100 mL (100 mL Intravenous Given 1/13/23 0614)     Diagnostic Studies  Results Reviewed     Procedure Component Value Units Date/Time    Blood culture #2 [[id number]] Collected: 01/13/23 0749    Lab Status: In process Specimen: Blood from Arm, Left Updated: 01/13/23 0755    Blood culture #1 [[id number]] Collected: 01/13/23 0735    Lab Status:  In process Specimen: Blood from Arm, Left Updated: 01/13/23 0755    UA w Reflex to Microscopic w Reflex to Culture [[id number]] Collected: 01/13/23 0524    Lab Status: Final result Specimen: Urine, Clean Catch Updated: 01/13/23 0532     Color, UA Yellow     Clarity, UA Clear     Specific Gravity, UA 1 010     pH, UA 7 0     Leukocytes, UA Negative     Nitrite, UA Negative     Protein, UA Negative mg/dl      Glucose, UA Negative mg/dl      Ketones, UA Negative mg/dl      Urobilinogen, UA 0 2 E U /dl      Bilirubin, UA Negative     Occult Blood, UA Negative    POCT pregnancy, urine [[id number]]  (Normal) Resulted: 01/13/23 0527    Lab Status: Final result Updated: 01/13/23 0527     EXT Preg Test, Ur Negative     Control Valid    Comprehensive metabolic panel [[id number]]  (Abnormal) Collected: 01/13/23 0407    Lab Status: Final result Specimen: Blood from Arm, Left Updated: 01/13/23 0452     Sodium 134 mmol/L      Potassium 3 7 mmol/L      Chloride 101 mmol/L      CO2 24 mmol/L      ANION GAP 9 mmol/L      BUN 10 mg/dL      Creatinine 0 73 mg/dL      Glucose 107 mg/dL      Calcium 8 5 mg/dL      AST 13 U/L      ALT 18 U/L      Alkaline Phosphatase 80 U/L      Total Protein 6 9 g/dL      Albumin 3 5 g/dL      Total Bilirubin 0 39 mg/dL      eGFR 111 ml/min/1 73sq m     Narrative:      [place] guidelines for Chronic Kidney Disease (CKD):   •  Stage 1 with normal or high GFR (GFR > 90 mL/min/1 73 square meters)  •  Stage 2 Mild CKD (GFR = 60-89 mL/min/1 73 square meters)  •  Stage 3A Moderate CKD (GFR = 45-59 mL/min/1 73 square meters)  •  Stage 3B Moderate CKD (GFR = 30-44 mL/min/1 73 square meters)  •  Stage 4 Severe CKD (GFR = 15-29 mL/min/1 73 square meters)  •  Stage 5 End Stage CKD (GFR <15 mL/min/1 73 square meters)  Note: GFR calculation is accurate only with a steady state creatinine    Magnesium [[id number]]  (Normal) Collected: 01/13/23 0407    Lab Status: Final result Specimen: Blood from Arm, Left Updated: 01/13/23 0452     Magnesium 1 8 mg/dL     Lipase [[id number]]  (Normal) Collected: 01/13/23 0407    Lab Status: Final result Specimen: Blood from Arm, Left Updated: 01/13/23 0452     Lipase 75 u/L     CBC and differential [[id number]]  (Abnormal) Collected: 01/13/23 0407    Lab Status: Final result Specimen: Blood from Arm, Left Updated: 01/13/23 0423     WBC 17 20 Thousand/uL      RBC 4 63 Million/uL      Hemoglobin 13 8 g/dL      Hematocrit 41 4 %      MCV 89 fL      MCH 29 8 pg      MCHC 33 3 g/dL      RDW 13 1 %      MPV 9 2 fL      Platelets 325 Thousands/uL      nRBC 0 /100 WBCs      Neutrophils Relative 77 %      Immat GRANS % 0 %      Lymphocytes Relative 17 %      Monocytes Relative 5 %      Eosinophils Relative 1 %      Basophils Relative 0 %      Neutrophils Absolute 13 34 Thousands/µL      Immature Grans Absolute 0 06 Thousand/uL      Lymphocytes Absolute 2 90 Thousands/µL      Monocytes Absolute 0 77 Thousand/µL      Eosinophils Absolute 0 08 Thousand/µL      Basophils Absolute 0 05 Thousands/µL              CT abdomen pelvis with contrast   Final Result by Julian Sanchez DO (01/13 4931)      Cholelithiasis within the mildly distended gallbladder, gallbladder wall appears mildly thickened, consider biliary colic versus acute cholecystitis depending on the clinical setting  Correlation with the patient's symptoms and laboratory values    recommended  Right upper quadrant ultrasound may be of benefit for further evaluation at the discretion of the referring caregiver  Left adnexal cyst, and other findings as above  Workstation performed: [id number]                Procedures  Procedures     ED Course  ED Course as of 01/13/23 0821   Fri Jan 13, 2023   0453 Leukocytosis  Hemoglobin is within normal limits  No significant abnormalities noted on CMP  Lipase is within normal limits  0104 Upon re-evaluation, the patient's symptoms are improved s/p ED treatments  0527 U preg negative  0120 No signs of UTI   0651 The patient was signed out to Dr Falguni Das  CT read is pending  The patient was stable while under my care  MDM    Disposition  Final diagnoses:   None     ED Disposition     None      Follow-up Information    None         Patient's Medications   Discharge Prescriptions    No medications on file       No discharge procedures on file      PDMP Review     None          ED Provider  Electronically Signed by           Izabel Valentino DO  01/13/23 9749

## 2023-01-13 NOTE — PLAN OF CARE
Problem: PAIN - ADULT  Goal: Verbalizes/displays adequate comfort level or baseline comfort level  Description: Interventions:  - Encourage patient to monitor pain and request assistance  - Assess pain using appropriate pain scale  - Administer analgesics based on type and severity of pain and evaluate response  - Implement non-pharmacological measures as appropriate and evaluate response  - Consider cultural and social influences on pain and pain management  - Notify physician/advanced practitioner if interventions unsuccessful or patient reports new pain  Outcome: Progressing     Problem: INFECTION - ADULT  Goal: Absence or prevention of progression during hospitalization  Description: INTERVENTIONS:  - Assess and monitor for signs and symptoms of infection  - Monitor lab/diagnostic results  - Monitor all insertion sites, i e  indwelling lines, tubes, and drains  - Monitor endotracheal if appropriate and nasal secretions for changes in amount and color  - [place] appropriate cooling/warming therapies per order  - Administer medications as ordered  - Instruct and encourage patient and family to use good hand hygiene technique  - Identify and instruct in appropriate isolation precautions for identified infection/condition  Outcome: Progressing     Problem: DISCHARGE PLANNING  Goal: Discharge to home or other facility with appropriate resources  Description: INTERVENTIONS:  - Identify barriers to discharge w/patient and caregiver  - Arrange for needed discharge resources and transportation as appropriate  - Identify discharge learning needs (meds, wound care, etc )  - Arrange for interpretive services to assist at discharge as needed  - Refer to Case Management Department for coordinating discharge planning if the patient needs post-hospital services based on physician/advanced practitioner order or complex needs related to functional status, cognitive ability, or social support system  Outcome: Progressing Problem: Knowledge Deficit  Goal: Patient/family/caregiver demonstrates understanding of disease process, treatment plan, medications, and discharge instructions  Description: Complete learning assessment and assess knowledge base    Interventions:  - Provide teaching at level of understanding  - Provide teaching via preferred learning methods  Outcome: Progressing

## 2023-01-13 NOTE — LETTER
[facility]  [address] [place] [address]  Dept: [phone]    January 14, 2023     Patient: Adan Seymour   YOB: 1995   Date of Visit: 1/13/2023       To Whom it May Concern:    Ruthy Don is under my professional care  She was seen in the hospital from 1/13/2023 to 01/14/23  She has a lifting restriction of 10 lbs x 2 weeks and may return to work on or around 1/30/2023 pending outpatient clearance  If you have any questions or concerns, please don't hesitate to call our office at [phone]       Sincerely,                Nik Henriquez PA-C

## 2023-01-13 NOTE — OP NOTE
OPERATIVE REPORT  PATIENT NAME: Cait Olivas    :  1995  MRN: [id number]  Pt Location: [facility] OR ROOM 01    SURGERY DATE: 2023    Surgeon(s) and Role:     * Lorice Collet, DO - Primary     * [place] PA-C - Assisting    Preop Diagnosis:  Abdominal pain [R10 9]  Cholecystitis [K81 9]    Post-Op Diagnosis Codes:     * Abdominal pain [R10 9]     * Cholecystitis [K81 9]    Procedure(s) (LRB):  CHOLECYSTECTOMY LAPAROSCOPIC (N/A)    Specimen(s):  ID Type Source Tests Collected by Time Destination   1 :  Tissue Gallbladder TISSUE EXAM Lorice Collet, DO 2023 12:31 PM        Estimated Blood Loss:   Minimal    Drains:  [REMOVED] NG/OG/Enteral Tube Orogastric 18 Fr Right mouth (Removed)   Number of days: 0       Anesthesia Type:   General    Operative Indications:  Abdominal pain [R10 9]  Cholecystitis [K81 9]      Operative Findings:  Acute cholecystitis with cholelithiasis    Complications:   None    Procedure and Technique:  The patient was brought to the operating room  A time-out was held and the patient identified and procedure verified  Appropriate prophylaxis and DVT prophylaxis was used  General anesthesia was administered  The area of the abdomen is prepped and draped in the usual sterile fashion  Local anesthesia using 0 25% Marcaine with epinephrine was infiltrated in the supraumbilical area  A small incision was made using 11 blade scalpel  The skin was grasped and elevated using towel clamps  A Veress needle was placed and confirmed to be intraperitoneal using a saline drop test   The abdomen was insufflated to 15 mmHg intraperitoneal pressure  A 5 mm trocar was placed  The abdomen was inspected and found to be free of adhesions  An additional 11 mm trocar was placed in the epigastric area this was done after infiltration of local anesthesia and under direct visualization    Two additional 5 mm trocars were placed in the right upper quadrant in the same fashion  The patient was placed in reverse Trendelenburg position and rotated towards the left side  The fundus of the gallbladder was grasped and elevated anteriorly and superiorly  Any adhesions to the gallbladder were taken down using blunt dissection and electrocautery  Josef's pouch was identified  The peritoneum surrounding Josef's pouch was incised  The cystic duct was identified  This was freed of all surrounding tissue  The cystic artery was also freed of all surrounding tissue  The critical view was obtained  Two clips were placed distally and 1 proximally on the cystic duct  The duct was divided between clips  The cystic artery was clipped and divided in the same manner  Hook electrocautery was then used to free the gallbladder from the hepatic fossa  The gallbladder was placed within a 10 mm Endo-Catch bag and removed through the epigastric port site  The hepatic fossa was inspected  Hemostasis was achieved using electrocautery  Fascia of the 11 mm trocar site was closed using a suture of 0 Vicryl with the laparoscopic suture Passer  All trocars were removed and the abdomen was desufflated  Skin was closed using 4 0 Vicryl in the subcuticular layer  All incisions were covered with skin glue  The patient tolerated the procedure well was transferred to recovery in stable condition  At the end the procedure all needle instrument sponge counts were correct x2     I was present for the entire procedure and A physician assistant was required during the procedure for retraction tissue handling,dissection and suturing    Patient Disposition:  PACU         SIGNATURE: Sakshi Sierra DO  DATE: January 13, 2023  TIME: 1:29 PM

## 2023-01-13 NOTE — ED NOTES
Assumed care of patient at this time, pt asleep  Father at bedside   Call bell within reach      [facility] VINAY Marcano RN  01/13/23 0916

## 2023-01-13 NOTE — ED CARE HANDOFF
Emergency Department Sign Out Note        Sign out and transfer of care from Dr Samuel Wright  See Separate Emergency Department note  The patient, Hussein Torres, was evaluated by the previous provider for back pain and abdominal pain  Workup Completed:  History and physical  Review of records and studies as needed  Laboratory studies and imaging as appropriate  Resuscitative measures as required or needed, pain medications and comfort needs as appropriate  Re-evaluation as needed  Signed out to me        ED Course / Workup Pending (followup):  78-year-old with back and abdominal pain  Vomiting x3  Right upper quadrant tenderness  CT concerning for biliary colic versus acute cholecystitis with a leukocytosis of 17,000  Will discuss with surgery for further evaluation  PERC Rule for PE    Flowsheet Row Most Recent Value   PERC Rule for PE    Age >=50 0 Filed at: 01/13/2023 0353   HR >=100 0 Filed at: 01/13/2023 0353   O2 Sat on room air < 95% 0 Filed at: 01/13/2023 0353   History of PE or DVT 0 Filed at: 01/13/2023 0353   Recent trauma or surgery 0 Filed at: 01/13/2023 0353   Hemoptysis 0 Filed at: 01/13/2023 0353   Exogenous estrogen 0 Filed at: 01/13/2023 0353   Unilateral leg swelling 0 Filed at: 01/13/2023 0353   PERC Rule for PE Results 0 Filed at: 01/13/2023 0353                        ED Course as of 01/13/23 0737   Fri Jan 13, 2023   0730 CT concerning for biliary colic versus cholecystitis           Disposition  Final diagnoses:   Abdominal pain   Cholecystitis     Time reflects when diagnosis was documented in both MDM as applicable and the Disposition within this note     Time User Action Codes Description Comment    1/13/2023  7:36 AM Felicita Herrera [R10 9] Abdominal pain     1/13/2023  7:36 AM Felicita Herrera [K81 9] Cholecystitis       ED Disposition     None      Follow-up Information    None       Patient's Medications   Discharge Prescriptions    No medications on file No discharge procedures on file         ED Provider  Electronically Signed by     Diego Zamora DO  01/13/23 4408

## 2023-01-13 NOTE — ANESTHESIA PREPROCEDURE EVALUATION
Procedure:  CHOLECYSTECTOMY LAPAROSCOPIC possible open (Abdomen)    Relevant Problems   ANESTHESIA (within normal limits)      CARDIO (within normal limits)      ENDO   (+) Hypothyroidism      GI/HEPATIC (within normal limits)      /RENAL (within normal limits)      GYN (within normal limits)      HEMATOLOGY (within normal limits)      MUSCULOSKELETAL (within normal limits)      NEURO/PSYCH (within normal limits)      PULMONARY   (+) Smoker      Digestive   (+) Cholecystitis        Physical Exam    Airway    Mallampati score: II  TM Distance: >3 FB  Neck ROM: full     Dental   No notable dental hx     Cardiovascular  Rhythm: regular, Rate: normal, Cardiovascular exam normal    Pulmonary  Pulmonary exam normal Breath sounds clear to auscultation,     Other Findings        Anesthesia Plan  ASA Score- 2     Anesthesia Type- general with ASA Monitors  Additional Monitors:   Airway Plan: ETT  Plan Factors-Exercise tolerance (METS): >4 METS  Chart reviewed  Existing labs reviewed  Patient summary reviewed  Induction- intravenous  Postoperative Plan- Plan for postoperative opioid use  Informed Consent- Anesthetic plan and risks discussed with patient  I personally reviewed this patient with the CRNA  Discussed and agreed on the Anesthesia Plan with the CRNA  Elvi Pandey Recent labs personally reviewed:  Lab Results   Component Value Date    WBC 17 20 (H) 01/13/2023    HGB 13 8 01/13/2023     01/13/2023     Lab Results   Component Value Date    K 3 7 01/13/2023    BUN 10 01/13/2023    CREATININE 0 73 01/13/2023     Lab Results   Component Value Date    PTT 29 01/03/2021      Lab Results   Component Value Date    INR 0 97 01/03/2021       I, Claudia Freitas MD, have personally seen and evaluated the patient prior to anesthetic care  I have reviewed the pre-anesthetic record, and other medical records if appropriate to the anesthetic care    If a CRNA is involved in the case, I have reviewed the CRNA assessment, if present, and agree  Risks/benefits and alternatives discussed with patient including possible PONV, sore throat, and possibility of rare anesthetic and surgical emergencies

## 2023-01-13 NOTE — DISCHARGE INSTR - AVS FIRST PAGE
Laparoscopic Cholecystectomy   WHAT YOU NEED TO KNOW:   Laparoscopic cholecystectomy is surgery to remove your gallbladder  DISCHARGE INSTRUCTIONS:   Call Dr Kendra Carrillo office at [phone] with any questions or concerns    Medicines: You may need any of the following:  Prescription pain medicine - Take as directed    You may also take tylenol as needed    Take your medicine as directed  Contact your healthcare provider if you think your medicine is not helping or if you have side effects  Tell her if you are allergic to any medicine  Keep a list of the medicines, vitamins, and herbs you take  Include the amounts, and when and why you take them  Bring the list or the pill bottles to follow-up visits  Carry your medicine list with you in case of an emergency  Follow up with your healthcare provider 2 weeks after surgery, or as directed:  Write down your questions so you remember to ask them during your visits  Wound care: You may shower and pat the incisions dry  Do not soak underwater for 2 weeks   What to eat after surgery: You may eat whatever you feel up to following surgery  You may notice diarrhea with fatty foods  Drink plenty of liquids  When to return to work and other activities:  No lifting, pushing, or pulling greater then 10lb for 2 weeks  Walk as much as possible  YOU may drive when you are comfortable enough to turn and press the brake without pain medication    Contact your healthcare provider if:   You have a fever over 101°F (38°C) or chills  You have pain or nausea that is not relieved by medicine  You have redness and swelling around your incisions, or blood or pus is leaking from your incisions  You are constipated or have diarrhea  Your skin or eyes are yellow, or your bowel movements are pale  You have questions or concerns about your surgery, condition, or care  Seek care immediately or call 911 if:   You cannot stop vomiting       Your bowel movements are black or bloody  You have pain in your abdomen and it is swollen or hard  Your arm or leg feels warm, tender, and painful  It may look swollen and red  You feel lightheaded, short of breath, and have chest pain  You cough up blood  © 2017 [employer] Information is for End User's use only and may not be sold, redistributed or otherwise used for commercial purposes  All illustrations and images included in CareNotes® are the copyrighted property of [employer]  or Memo Martinez  The above information is an  only  It is not intended as medical advice for individual conditions or treatments  Talk to your doctor, nurse or pharmacist before following any medical regimen to see if it is safe and effective for you

## 2023-01-14 VITALS
BODY MASS INDEX: 25.11 KG/M2 | SYSTOLIC BLOOD PRESSURE: 151 MMHG | HEIGHT: 67 IN | OXYGEN SATURATION: 95 % | HEART RATE: 84 BPM | TEMPERATURE: 98.1 F | DIASTOLIC BLOOD PRESSURE: 84 MMHG | WEIGHT: 160 LBS | RESPIRATION RATE: 18 BRPM

## 2023-01-14 LAB
ALBUMIN SERPL BCP-MCNC: 2.9 G/DL (ref 3.5–5)
ALP SERPL-CCNC: 75 U/L (ref 46–116)
ALT SERPL W P-5'-P-CCNC: 34 U/L (ref 12–78)
ANION GAP SERPL CALCULATED.3IONS-SCNC: 8 MMOL/L (ref 4–13)
AST SERPL W P-5'-P-CCNC: 32 U/L (ref 5–45)
BILIRUB SERPL-MCNC: 0.35 MG/DL (ref 0.2–1)
BUN SERPL-MCNC: 7 MG/DL (ref 5–25)
CALCIUM ALBUM COR SERPL-MCNC: 9.2 MG/DL (ref 8.3–10.1)
CALCIUM SERPL-MCNC: 8.3 MG/DL (ref 8.3–10.1)
CHLORIDE SERPL-SCNC: 106 MMOL/L (ref 96–108)
CO2 SERPL-SCNC: 22 MMOL/L (ref 21–32)
CREAT SERPL-MCNC: 0.68 MG/DL (ref 0.6–1.3)
ERYTHROCYTE [DISTWIDTH] IN BLOOD BY AUTOMATED COUNT: 13.1 % (ref 11.6–15.1)
GFR SERPL CREATININE-BSD FRML MDRD: 118 ML/MIN/1.73SQ M
GLUCOSE SERPL-MCNC: 108 MG/DL (ref 65–140)
HCT VFR BLD AUTO: 36.9 % (ref 34.8–46.1)
HGB BLD-MCNC: 12.5 G/DL (ref 11.5–15.4)
MCH RBC QN AUTO: 30.6 PG (ref 26.8–34.3)
MCHC RBC AUTO-ENTMCNC: 33.9 G/DL (ref 31.4–37.4)
MCV RBC AUTO: 90 FL (ref 82–98)
PLATELET # BLD AUTO: 338 THOUSANDS/UL (ref 149–390)
PMV BLD AUTO: 9.2 FL (ref 8.9–12.7)
POTASSIUM SERPL-SCNC: 4.3 MMOL/L (ref 3.5–5.3)
PROT SERPL-MCNC: 5.9 G/DL (ref 6.4–8.4)
RBC # BLD AUTO: 4.09 MILLION/UL (ref 3.81–5.12)
SODIUM SERPL-SCNC: 136 MMOL/L (ref 135–147)
WBC # BLD AUTO: 18.5 THOUSAND/UL (ref 4.31–10.16)

## 2023-01-14 RX ADMIN — IBUPROFEN 600 MG: 600 TABLET ORAL at 08:06

## 2023-01-14 RX ADMIN — SODIUM CHLORIDE 125 ML/HR: 0.9 INJECTION, SOLUTION INTRAVENOUS at 05:45

## 2023-01-14 RX ADMIN — LEVOTHYROXINE SODIUM 125 MCG: 125 TABLET ORAL at 08:06

## 2023-01-14 NOTE — PROGRESS NOTES
Progress Note - General Surgery   Annia Bence 32 y o  female MRN: [id number]  Unit/Bed#: -01 Encounter: [id number]    Assessment:  32 y o  female POD1 s/p lap randall  - Afebrile, VSS on room air  - Leukocytosis - 18 50 (17 20)  - Hgb stable 12 5 (13 8)   - LFTs remain WNL   - Tbili 0 35 (0 39)  - Current every day smoker  - Pain controled with Ibuprofen    Plan:  - Will monitor patient for tolerance of lunch   - Discharge planning for today  - Pain control with IBF/Tylenol PRN at home  - Work note provided  - May shower, allow water to run over incision sites without scrubbing, pat dry  - No lifting of over 10lbs x 2 weeks  - Follow up with outpatient General Surgery office in about 2 weeks    Subjective:   Patient states she continues to have some pain of the abdomen  She explains she hadn't eaten in almost 24 hours so was very hungry when the dinner tray came  She feels as though she may have overdone it because she ate significant amounts quickly  Patient then had some nausea and increased pain following this  She denies continued nausea or vomiting  She ate minimal breakfast but states it wasn't due to feeling sick rather she wasn't in the mood for the pancakes provided  Her pain is well controled with ibuprofen at this time  She has been attempting to use incentive spirometer and has been out of bed ambulating to restroom  Patient is passing flatus without BM, urinating without issue  Objective:   Blood pressure 151/84, pulse 84, temperature 98 1 °F (36 7 °C), resp  rate 18, height 5' 7" (1 702 m), weight 72 6 kg (160 lb), last menstrual period 12/21/2022, SpO2 95 %  ,Body mass index is 25 06 kg/m²      Intake/Output Summary (Last 24 hours) at 1/14/2023 0913  Last data filed at 1/13/2023 1332  Gross per 24 hour   Intake 1300 ml   Output --   Net 1300 ml     Invasive Devices     Peripheral Intravenous Line  Duration           Peripheral IV 01/13/23 Left;Lateral Antecubital 1 day Physical Exam:  General: no acute distress, pt appears well and comfortable, resting in bed at time of arrival  Skin: warm and dry to touch  Pulmonary: normal effort  Abdominal: soft, non-distended, tenderness to palpation most significantly to the right side into midline, incision sites are clean and dry at this time, patient noted umbilical incision had some bloody drainage overnight but dressing was taken down and there appears to be no active drainage or bleeding at this time  Neuro: alert and oriented     Lab, Imaging and other studies:  I have personally reviewed pertinent lab results      CBC:   Lab Results   Component Value Date    WBC 18 50 (H) 01/14/2023    HGB 12 5 01/14/2023    HCT 36 9 01/14/2023    MCV 90 01/14/2023     01/14/2023    MCH 30 6 01/14/2023    MCHC 33 9 01/14/2023    RDW 13 1 01/14/2023    MPV 9 2 01/14/2023   , CMP:   Lab Results   Component Value Date    SODIUM 136 01/14/2023    K 4 3 01/14/2023     01/14/2023    CO2 22 01/14/2023    BUN 7 01/14/2023    CREATININE 0 68 01/14/2023    CALCIUM 8 3 01/14/2023    AST 32 01/14/2023    ALT 34 01/14/2023    ALKPHOS 75 01/14/2023    EGFR 118 01/14/2023     VTE Pharmacologic Prophylaxis: Reason for no pharmacologic prophylaxis minimal co-morbidities, young, healthy  VTE Mechanical Prophylaxis: sequential compression device    Akty Lora Senthil  1/14/2023

## 2023-01-14 NOTE — PLAN OF CARE
Problem: MOBILITY - ADULT  Goal: Maintain or return to baseline ADL function  Description: INTERVENTIONS:  -  Assess patient's ability to carry out ADLs; assess patient's baseline for ADL function and identify physical deficits which impact ability to perform ADLs (bathing, care of mouth/teeth, toileting, grooming, dressing, etc )  - Assess/evaluate cause of self-care deficits   - Assess range of motion  - Assess patient's mobility; develop plan if impaired  - Assess patient's need for assistive devices and provide as appropriate  - Encourage maximum independence but intervene and supervise when necessary  - Involve family in performance of ADLs  - Assess for home care needs following discharge   - Consider OT consult to assist with ADL evaluation and planning for discharge  - Provide patient education as appropriate  Outcome: Progressing  Goal: Maintains/Returns to pre admission functional level  Description: INTERVENTIONS:  - Perform BMAT or MOVE assessment daily    - Set and communicate daily mobility goal to care team and patient/family/caregiver     - Collaborate with rehabilitation services on mobility goals if consulted  - Ambulate patient 3 times a day  - Out of bed to chair 3 times a day   - Out of bed for meals 3 times a day  - Out of bed for toileting  - Record patient progress and toleration of activity level   Outcome: Progressing     Problem: PAIN - ADULT  Goal: Verbalizes/displays adequate comfort level or baseline comfort level  Description: Interventions:  - Encourage patient to monitor pain and request assistance  - Assess pain using appropriate pain scale  - Administer analgesics based on type and severity of pain and evaluate response  - Implement non-pharmacological measures as appropriate and evaluate response  - Consider cultural and social influences on pain and pain management  - Notify physician/advanced practitioner if interventions unsuccessful or patient reports new pain  Outcome: Progressing     Problem: INFECTION - ADULT  Goal: Absence or prevention of progression during hospitalization  Description: INTERVENTIONS:  - Assess and monitor for signs and symptoms of infection  - Monitor lab/diagnostic results  - Monitor all insertion sites, i e  indwelling lines, tubes, and drains  - Monitor endotracheal if appropriate and nasal secretions for changes in amount and color  - [place] appropriate cooling/warming therapies per order  - Administer medications as ordered  - Instruct and encourage patient and family to use good hand hygiene technique  - Identify and instruct in appropriate isolation precautions for identified infection/condition  Outcome: Progressing     Problem: SAFETY ADULT  Goal: Maintain or return to baseline ADL function  Description: INTERVENTIONS:  -  Assess patient's ability to carry out ADLs; assess patient's baseline for ADL function and identify physical deficits which impact ability to perform ADLs (bathing, care of mouth/teeth, toileting, grooming, dressing, etc )  - Assess/evaluate cause of self-care deficits   - Assess range of motion  - Assess patient's mobility; develop plan if impaired  - Assess patient's need for assistive devices and provide as appropriate  - Encourage maximum independence but intervene and supervise when necessary  - Involve family in performance of ADLs  - Assess for home care needs following discharge   - Consider OT consult to assist with ADL evaluation and planning for discharge  - Provide patient education as appropriate  Outcome: Progressing  Goal: Maintains/Returns to pre admission functional level  Description: INTERVENTIONS:  - Perform BMAT or MOVE assessment daily    - Set and communicate daily mobility goal to care team and patient/family/caregiver     - Collaborate with rehabilitation services on mobility goals if consulted  - Ambulate patient 3 times a day  - Out of bed to chair 3 times a day   - Out of bed for meals 3 times a day  - Out of bed for toileting  - Record patient progress and toleration of activity level   Outcome: Progressing  Goal: Patient will remain free of falls  Description: INTERVENTIONS:  - Educate patient/family on patient safety including physical limitations  - Instruct patient to call for assistance with activity   - Consult OT/PT to assist with strengthening/mobility   - Keep Call bell within reach  - Keep bed low and locked with side rails adjusted as appropriate  - Keep care items and personal belongings within reach  - Initiate and maintain comfort rounds  - Make Fall Risk Sign visible to staff  - Offer Toileting every 2 Hours, in advance of need  - Consider moving patient to room near nurses station  Outcome: Progressing     Problem: DISCHARGE PLANNING  Goal: Discharge to home or other facility with appropriate resources  Description: INTERVENTIONS:  - Identify barriers to discharge w/patient and caregiver  - Arrange for needed discharge resources and transportation as appropriate  - Identify discharge learning needs (meds, wound care, etc )  - Arrange for interpretive services to assist at discharge as needed  - Refer to Case Management Department for coordinating discharge planning if the patient needs post-hospital services based on physician/advanced practitioner order or complex needs related to functional status, cognitive ability, or social support system  Outcome: Progressing     Problem: Knowledge Deficit  Goal: Patient/family/caregiver demonstrates understanding of disease process, treatment plan, medications, and discharge instructions  Description: Complete learning assessment and assess knowledge base    Interventions:  - Provide teaching at level of understanding  - Provide teaching via preferred learning methods  Outcome: Progressing

## 2023-01-14 NOTE — DISCHARGE SUMMARY
Discharge Summary - Jose E Phoenix 32 y o  female MRN: [id number]  Unit/Bed#: -01 Encounter: [id number]    Admission Date:   Admission Orders (From admission, onward)     Ordered        01/13/23 0827  Place in Observation  Once                      Admitting Diagnosis: Back pain [M54 9]  Cholecystitis [K81 9]  Abdominal pain [R10 9]    HPI: per H&P performed 1/13: Jose E Phoenix is a 32 y o  female with past medical history significant for psoriasis, prior fracture of left arm with plates and screws, tobacco use who initially presented to [facility] ED with complaints of back pain  Patient explains pain began in the back last night but then moved forward into the abdomen  Initially she points to RLQ as main area of pain but also feels this in RUQ  Patient denies any association with food or prior episodes of same  She did have some associated nausea and vomiting  She last ate last evening around 7PM  She denies fevers, chills, chest pain, any issues with BM or urination  Patient did experience some shortness of breath with extreme pain  She admits to being everyday smoker and occasional marijuana use  Procedures Performed: CHOLECYSTECTOMY LAPAROSCOPIC (N/A)    Summary of Hospital Course: Patient presented with above complaints for which the above procedure was performed with noted findings and without complication  Post-operatively patient experienced some pain and nausea following dinner second to significant intake over short interval of time  This was relieved with medication  POD1 patient denied any further nausea  She had tolerated some of her breakfast without changes in pain  Patient was passing flatus and urinating without issue  Pain was controlled with use of Ibuprofen alone  Patient was deemed stable for discharge pending tolerance of more intake for lunch without nausea, need for zofran, or pain medication beyond ibuprofen/tylenol   Discharge information reviewed with patient including ability to shower without scrubbing at sites, use of ibuprofen as needed for pain control, taking it easy with food, lifting restriction 10lb x 2 weeks, and need for follow up in outpatient office  Patient aware to call office number with questions or concerns  Significant Findings, Care, Treatment and Services Provided: Acute cholecystitis with cholelithiasis    Complications: None    Discharge Diagnosis: Back pain [M54 9]  Cholecystitis [K81 9]  Abdominal pain [R10 9]    Condition at Discharge: good     Discharge instructions/Information to patient and family:   See after visit summary for information provided to patient and family  Provisions for Follow-Up Care:  See after visit summary for information related to follow-up care and any pertinent home health orders  PCP: Porfirio Matute MD    Disposition: Home    Planned Readmission: No    Discharge Statement   I spent 15 minutes discharging the patient  This time was spent on the day of discharge  I had direct contact with the patient on the day of discharge  Additional documentation is required if more than 30 minutes were spent on discharge  Discharge Medications:  See after visit summary for reconciled discharge medications provided to patient and family         Estelle Freshwater Billig  1/14/2023

## 2023-01-18 LAB
BACTERIA BLD CULT: NORMAL
BACTERIA BLD CULT: NORMAL

## 2023-01-26 ENCOUNTER — APPOINTMENT (EMERGENCY)
Dept: CT IMAGING | Facility: HOSPITAL | Age: 28
End: 2023-01-26

## 2023-01-26 ENCOUNTER — HOSPITAL ENCOUNTER (EMERGENCY)
Facility: HOSPITAL | Age: 28
Discharge: HOME | End: 2023-01-26
Attending: EMERGENCY MEDICINE

## 2023-01-26 VITALS
RESPIRATION RATE: 16 BRPM | SYSTOLIC BLOOD PRESSURE: 103 MMHG | TEMPERATURE: 97.5 F | HEART RATE: 70 BPM | BODY MASS INDEX: 27.06 KG/M2 | WEIGHT: 172.8 LBS | DIASTOLIC BLOOD PRESSURE: 75 MMHG | OXYGEN SATURATION: 98 %

## 2023-01-26 DIAGNOSIS — R10.9 ABDOMINAL PAIN: Primary | ICD-10-CM

## 2023-01-26 LAB
ALBUMIN SERPL BCP-MCNC: 4.2 G/DL (ref 3.5–5)
ALP SERPL-CCNC: 71 U/L (ref 34–104)
ALT SERPL W P-5'-P-CCNC: 16 U/L (ref 7–52)
ANION GAP SERPL CALCULATED.3IONS-SCNC: 8 MMOL/L (ref 4–13)
AST SERPL W P-5'-P-CCNC: 16 U/L (ref 13–39)
BASOPHILS # BLD AUTO: 0.08 THOUSANDS/ÂΜL (ref 0–0.1)
BASOPHILS NFR BLD AUTO: 1 % (ref 0–1)
BILIRUB SERPL-MCNC: 0.53 MG/DL (ref 0.2–1)
BILIRUB UR QL STRIP: NEGATIVE
BUN SERPL-MCNC: 9 MG/DL (ref 5–25)
CALCIUM SERPL-MCNC: 9.1 MG/DL (ref 8.4–10.2)
CHLORIDE SERPL-SCNC: 106 MMOL/L (ref 96–108)
CLARITY UR: NORMAL
CO2 SERPL-SCNC: 23 MMOL/L (ref 21–32)
COLOR UR: YELLOW
CREAT SERPL-MCNC: 0.75 MG/DL (ref 0.6–1.3)
EOSINOPHIL # BLD AUTO: 0.27 THOUSAND/ÂΜL (ref 0–0.61)
EOSINOPHIL NFR BLD AUTO: 2 % (ref 0–6)
ERYTHROCYTE [DISTWIDTH] IN BLOOD BY AUTOMATED COUNT: 13.1 % (ref 11.6–15.1)
EXT PREGNANCY TEST URINE: NEGATIVE
EXT. CONTROL: NORMAL
GFR SERPL CREATININE-BSD FRML MDRD: 108 ML/MIN/1.73SQ M
GLUCOSE SERPL-MCNC: 72 MG/DL (ref 65–140)
GLUCOSE UR STRIP-MCNC: NEGATIVE MG/DL
HCT VFR BLD AUTO: 44 % (ref 34.8–46.1)
HGB BLD-MCNC: 14.2 G/DL (ref 11.5–15.4)
HGB UR QL STRIP.AUTO: NEGATIVE
IMM GRANULOCYTES # BLD AUTO: 0.05 THOUSAND/UL (ref 0–0.2)
IMM GRANULOCYTES NFR BLD AUTO: 0 % (ref 0–2)
KETONES UR STRIP-MCNC: NEGATIVE MG/DL
LACTATE SERPL-SCNC: 1 MMOL/L (ref 0.5–2)
LEUKOCYTE ESTERASE UR QL STRIP: NEGATIVE
LIPASE SERPL-CCNC: 43 U/L (ref 11–82)
LYMPHOCYTES # BLD AUTO: 4.17 THOUSANDS/ÂΜL (ref 0.6–4.47)
LYMPHOCYTES NFR BLD AUTO: 36 % (ref 14–44)
MCH RBC QN AUTO: 29.5 PG (ref 26.8–34.3)
MCHC RBC AUTO-ENTMCNC: 32.3 G/DL (ref 31.4–37.4)
MCV RBC AUTO: 91 FL (ref 82–98)
MONOCYTES # BLD AUTO: 0.52 THOUSAND/ÂΜL (ref 0.17–1.22)
MONOCYTES NFR BLD AUTO: 5 % (ref 4–12)
NEUTROPHILS # BLD AUTO: 6.57 THOUSANDS/ÂΜL (ref 1.85–7.62)
NEUTS SEG NFR BLD AUTO: 56 % (ref 43–75)
NITRITE UR QL STRIP: NEGATIVE
NRBC BLD AUTO-RTO: 0 /100 WBCS
PH UR STRIP.AUTO: 6 [PH]
PLATELET # BLD AUTO: 354 THOUSANDS/UL (ref 149–390)
PMV BLD AUTO: 8.9 FL (ref 8.9–12.7)
POTASSIUM SERPL-SCNC: 4.4 MMOL/L (ref 3.5–5.3)
PROT SERPL-MCNC: 7 G/DL (ref 6.4–8.4)
PROT UR STRIP-MCNC: NEGATIVE MG/DL
RBC # BLD AUTO: 4.82 MILLION/UL (ref 3.81–5.12)
SODIUM SERPL-SCNC: 137 MMOL/L (ref 135–147)
SP GR UR STRIP.AUTO: >=1.03 (ref 1–1.03)
UROBILINOGEN UR QL STRIP.AUTO: 0.2 E.U./DL
WBC # BLD AUTO: 11.66 THOUSAND/UL (ref 4.31–10.16)

## 2023-01-26 RX ORDER — KETOROLAC TROMETHAMINE 30 MG/ML
15 INJECTION, SOLUTION INTRAMUSCULAR; INTRAVENOUS ONCE
Status: COMPLETED | OUTPATIENT
Start: 2023-01-26 | End: 2023-01-26

## 2023-01-26 RX ADMIN — KETOROLAC TROMETHAMINE 15 MG: 30 INJECTION, SOLUTION INTRAMUSCULAR at 13:30

## 2023-01-26 RX ADMIN — IOHEXOL 100 ML: 350 INJECTION, SOLUTION INTRAVENOUS at 14:05

## 2023-01-26 NOTE — DISCHARGE INSTRUCTIONS
Please follow-up with your surgeon and your family doctor  Alternate between Tylenol and ibuprofen as needed for pain control  Return with any new or worsening symptoms    PE Study with CT abdomen & pelvis with contrast   Final Result   No central pulmonary emboli  No right heart strain  Enlarged thyroid gland with no discrete nodules  Enlarged liver  No mass or collection in the cholecystectomy bed  Formed stool the right colon suggesting mild constipation  Left colon mostly collapsed                       Workstation performed: [id number]

## 2023-01-26 NOTE — ED PROVIDER NOTES
History  Chief Complaint   Patient presents with   • Abdominal Pain     Abdominal pain had gall bladder surgery 2 weeks ago and called the surgeon today and told to come to the ED for evaluation      61-year-old female presented to the emergency department for evaluation of abdominal pain  Patient is status post cholecystectomy 2 weeks ago  States she was instructed to have her follow-up with her surgeon today however due to shortness of breath and concern for PE as well as discomfort and was instructed to come to the emergency department for further evaluation  Patient additionally reports she expressed concern for shortness of breath over the past 2 weeks  She denies any cough, congestion, fevers or chills  Patient denies nausea, vomiting  She reports normal bowel movements  Denies any dysuria, hematuria, urinary frequency  Patient denies chest pain or palpitations  History provided by:  Patient  Abdominal Pain  Pain location:  RUQ and RLQ  Pain quality: sharp    Pain radiates to:  Does not radiate  Pain severity:  Mild  Onset quality:  Gradual  Duration:  2 weeks  Timing:  Constant  Chronicity:  New  Relieved by:  Nothing  Ineffective treatments:  None tried  Associated symptoms: shortness of breath    Associated symptoms: no anorexia, no belching, no chest pain, no chills, no constipation, no cough, no diarrhea, no dysuria, no fatigue, no fever, no flatus, no hematemesis, no hematochezia, no hematuria, no melena, no nausea, no sore throat, no vaginal bleeding, no vaginal discharge and no vomiting        Prior to Admission Medications   Prescriptions Last Dose Informant Patient Reported?  Taking?   levothyroxine 125 mcg tablet   Yes No   Sig: Take 125 mcg by mouth daily      Facility-Administered Medications: None       Past Medical History:   Diagnosis Date   • Hypothyroidism    • Psoriasis        Past Surgical History:   Procedure Laterality Date   • CHOLECYSTECTOMY LAPAROSCOPIC N/A 1/13/2023 Procedure: CHOLECYSTECTOMY LAPAROSCOPIC;  Surgeon: Shaniqua Sandoval DO;  Location:  MAIN OR;  Service: General   • FRACTURE SURGERY     • TONSILLECTOMY         Family History   Problem Relation Age of Onset   • Aneurysm Mother    • Hypertension Father      I have reviewed and agree with the history as documented  E-Cigarette/Vaping   • E-Cigarette Use Never User      E-Cigarette/Vaping Substances   • Nicotine No    • THC No    • CBD No    • Flavoring No    • Other No    • Unknown No      Social History     Tobacco Use   • Smoking status: Every Day     Packs/day: 1 00     Types: Cigarettes   • Smokeless tobacco: Never   Vaping Use   • Vaping Use: Never used   Substance Use Topics   • Alcohol use: Yes     Comment: socially    • Drug use: Yes     Types: Marijuana     Comment: day before surgery       Review of Systems   Constitutional: Negative for appetite change, chills, diaphoresis, fatigue and fever  HENT: Negative  Negative for sore throat  Respiratory: Positive for shortness of breath  Negative for cough, choking and chest tightness  Cardiovascular: Negative for chest pain, palpitations and leg swelling  Gastrointestinal: Positive for abdominal pain  Negative for abdominal distention, anorexia, constipation, diarrhea, flatus, hematemesis, hematochezia, melena, nausea and vomiting  Genitourinary: Negative  Negative for dysuria, hematuria, vaginal bleeding and vaginal discharge  Musculoskeletal: Negative  Skin: Negative  Neurological: Negative  All other systems reviewed and are negative  Physical Exam  Physical Exam  Vitals and nursing note reviewed  Constitutional:       General: She is not in acute distress  Appearance: She is well-developed  She is not ill-appearing, toxic-appearing or diaphoretic  HENT:      Head: Normocephalic  Mouth/Throat:      Mouth: Mucous membranes are moist    Eyes:      Extraocular Movements: Extraocular movements intact  Cardiovascular:      Rate and Rhythm: Normal rate and regular rhythm  Pulmonary:      Effort: Pulmonary effort is normal  No respiratory distress  Breath sounds: Normal breath sounds  No stridor  No wheezing, rhonchi or rales  Chest:      Chest wall: No tenderness  Abdominal:      General: Abdomen is flat  Bowel sounds are normal       Palpations: Abdomen is soft  Tenderness: There is abdominal tenderness in the right upper quadrant and right lower quadrant  There is no right CVA tenderness or left CVA tenderness  Comments: surgical sites appear to be well healing   Skin:     General: Skin is warm and dry  Capillary Refill: Capillary refill takes less than 2 seconds  Neurological:      General: No focal deficit present  Mental Status: She is alert and oriented to person, place, and time     Psychiatric:         Mood and Affect: Mood normal          Behavior: Behavior normal          Vital Signs  ED Triage Vitals [01/26/23 1128]   Temperature Pulse Respirations Blood Pressure SpO2   97 5 °F (36 4 °C) 76 16 111/80 100 %      Temp Source Heart Rate Source Patient Position - Orthostatic VS BP Location FiO2 (%)   Temporal Monitor Sitting Left arm --      Pain Score       7           Vitals:    01/26/23 1128 01/26/23 1335 01/26/23 1532   BP: 111/80 106/71 103/75   Pulse: 76 73 70   Patient Position - Orthostatic VS: Sitting Lying Sitting         Visual Acuity      ED Medications  Medications   ketorolac (TORADOL) injection 15 mg (15 mg Intravenous Given 1/26/23 1330)   iohexol (OMNIPAQUE) 350 MG/ML injection (SINGLE-DOSE) 100 mL (100 mL Intravenous Given 1/26/23 1405)       Diagnostic Studies  Results Reviewed     Procedure Component Value Units Date/Time    Lactic acid [[id number]]  (Normal) Collected: 01/26/23 1256    Lab Status: Final result Specimen: Blood from Arm, Left Updated: 01/26/23 1335     LACTIC ACID 1 0 mmol/L     Narrative:      Result may be elevated if tourniquet was used during collection      Lipase [[id number]]  (Normal) Collected: 01/26/23 1256    Lab Status: Final result Specimen: Blood from Arm, Left Updated: 01/26/23 1327     Lipase 43 u/L     Comprehensive metabolic panel [[id number]] Collected: 01/26/23 1256    Lab Status: Final result Specimen: Blood from Arm, Left Updated: 01/26/23 1327     Sodium 137 mmol/L      Potassium 4 4 mmol/L      Chloride 106 mmol/L      CO2 23 mmol/L      ANION GAP 8 mmol/L      BUN 9 mg/dL      Creatinine 0 75 mg/dL      Glucose 72 mg/dL      Calcium 9 1 mg/dL      AST 16 U/L      ALT 16 U/L      Alkaline Phosphatase 71 U/L      Total Protein 7 0 g/dL      Albumin 4 2 g/dL      Total Bilirubin 0 53 mg/dL      eGFR 108 ml/min/1 73sq m     Narrative:      [place] guidelines for Chronic Kidney Disease (CKD):   •  Stage 1 with normal or high GFR (GFR > 90 mL/min/1 73 square meters)  •  Stage 2 Mild CKD (GFR = 60-89 mL/min/1 73 square meters)  •  Stage 3A Moderate CKD (GFR = 45-59 mL/min/1 73 square meters)  •  Stage 3B Moderate CKD (GFR = 30-44 mL/min/1 73 square meters)  •  Stage 4 Severe CKD (GFR = 15-29 mL/min/1 73 square meters)  •  Stage 5 End Stage CKD (GFR <15 mL/min/1 73 square meters)  Note: GFR calculation is accurate only with a steady state creatinine    UA (URINE) with reflex to Scope [[id number]] Collected: 01/26/23 1256    Lab Status: Final result Specimen: Urine, Clean Catch Updated: 01/26/23 1320     Color, UA Yellow     Clarity, UA Slightly Cloudy     Specific Gravity, UA >=1 030     pH, UA 6 0     Leukocytes, UA Negative     Nitrite, UA Negative     Protein, UA Negative mg/dl      Glucose, UA Negative mg/dl      Ketones, UA Negative mg/dl      Urobilinogen, UA 0 2 E U /dl      Bilirubin, UA Negative     Occult Blood, UA Negative    CBC and differential [[id number]]  (Abnormal) Collected: 01/26/23 1256    Lab Status: Final result Specimen: Blood from Arm, Left Updated: 01/26/23 1305     WBC 11 66 Thousand/uL RBC 4 82 Million/uL      Hemoglobin 14 2 g/dL      Hematocrit 44 0 %      MCV 91 fL      MCH 29 5 pg      MCHC 32 3 g/dL      RDW 13 1 %      MPV 8 9 fL      Platelets 015 Thousands/uL      nRBC 0 /100 WBCs      Neutrophils Relative 56 %      Immat GRANS % 0 %      Lymphocytes Relative 36 %      Monocytes Relative 5 %      Eosinophils Relative 2 %      Basophils Relative 1 %      Neutrophils Absolute 6 57 Thousands/µL      Immature Grans Absolute 0 05 Thousand/uL      Lymphocytes Absolute 4 17 Thousands/µL      Monocytes Absolute 0 52 Thousand/µL      Eosinophils Absolute 0 27 Thousand/µL      Basophils Absolute 0 08 Thousands/µL     POCT pregnancy, urine [[id number]]  (Normal) Resulted: 01/26/23 1257    Lab Status: Final result Updated: 01/26/23 1257     EXT Preg Test, Ur Negative     Control Valid                 PE Study with CT abdomen & pelvis with contrast   Final Result by Radha Borden DO (01/26 1525)   No central pulmonary emboli  No right heart strain  Enlarged thyroid gland with no discrete nodules  Enlarged liver  No mass or collection in the cholecystectomy bed  Formed stool the right colon suggesting mild constipation  Left colon mostly collapsed  Workstation performed: [id number]                    Procedures  Procedures         ED Course  ED Course as of 01/26/23 1829   Thu Jan 26, 2023   1308 WBC(!): 11 66   1339 LACTIC ACID: 1 0   1339 Lipase: 43   1339 CMP unremarkable    1529 CT: No central pulmonary emboli  No right heart strain      Enlarged thyroid gland with no discrete nodules  Enlarged liver  No mass or collection in the cholecystectomy bed      Formed stool the right colon suggesting mild constipation  Left colon mostly collapsed  1533 Discussed results and findings with the patient  We discussed symptomatic treatment at home and appropriate follow-up  We discussed strict return cautions and she verbalized understanding    She was clinically and hemodynamically stable for discharge                     SBIRT 20yo+    Flowsheet Row Most Recent Value   SBIRT (25 yo +)    In order to provide better care to our patients, we are screening all of our patients for alcohol and drug use  Would it be okay to ask you these screening questions? Yes Filed at: 01/26/2023 153   Initial Alcohol Screen: US AUDIT-C     1  How often do you have a drink containing alcohol? 0 Filed at: 01/26/2023 153   2  How many drinks containing alcohol do you have on a typical day you are drinking? 0 Filed at: 01/26/2023 1532   3b  FEMALE Any Age, or MALE 65+: How often do you have 4 or more drinks on one occassion? 1 Filed at: 01/26/2023 1534   Audit-C Score 1 Filed at: 01/26/2023 1531   RIA: How many times in the past year have you    Used an illegal drug or used a prescription medication for non-medical reasons? Never Filed at: 01/26/2023 1539                    Medical Decision Making  78-year-old female presented to the emergency department for evaluation of abdominal pain, shortness of breath status post cholecystectomy 2 weeks ago  Vitals and medical record reviewed  On exam lungs clear  Heart regular rate and rhythm  Due to recent surgery and surgeon concern we will proceed with CTA to rule out PE  On exam she had some right-sided abdominal tenderness, surgical sites appear to be well-healing  She did have a minimally elevated white blood cell count  Normal lactic acid  Normal lipase  Her CT was negative for PE noted for some incidental findings  Did have some constipation noted on exam   Discussed all results and findings including symptomatic treatment at home and appropriate follow-up with her surgeon and family doctor  We discussed high-fiber diet  Patient was agreeable this treatment plan    She is clinically and hemodynamically stable for discharge    Abdominal pain: acute illness or injury  Amount and/or Complexity of Data Reviewed  External Data Reviewed: notes  Details: surgical notes  Labs: ordered  Decision-making details documented in ED Course  Radiology: ordered  Risk  Prescription drug management  Disposition  Final diagnoses:   Abdominal pain     Time reflects when diagnosis was documented in both MDM as applicable and the Disposition within this note     Time User Action Codes Description Comment    1/26/2023  3:34 PM Nati Clolado Add [R10 9] Abdominal pain       ED Disposition     ED Disposition   Discharge    Condition   Stable    Date/Time   Thu Jan 26, 2023  3:34 PM    Comment   Ilya Foley discharge to home/self care  Follow-up Information     Follow up With Specialties Details Why [address], [place] Family Medicine   [address] [facility] [address] [phone]      Christen Cline PA-C Physician Assistant, General Surgery   [address] [facility] [address] [facility]  [phone]            Discharge Medication List as of 1/26/2023  3:35 PM      CONTINUE these medications which have NOT CHANGED    Details   levothyroxine 125 mcg tablet Take 125 mcg by mouth daily, Historical Med             No discharge procedures on file      PDMP Review     None          ED Provider  Electronically Signed by           Emmie Naqvi PA-C  01/26/23 1431 [facility] DYAN Bernard  01/26/23 [id number]

## 2023-04-21 ENCOUNTER — APPOINTMENT (EMERGENCY)
Dept: RADIOLOGY | Facility: HOSPITAL | Age: 28
End: 2023-04-21

## 2023-04-21 ENCOUNTER — APPOINTMENT (EMERGENCY)
Dept: CT IMAGING | Facility: HOSPITAL | Age: 28
End: 2023-04-21

## 2023-04-21 ENCOUNTER — HOSPITAL ENCOUNTER (EMERGENCY)
Facility: HOSPITAL | Age: 28
Discharge: HOME | End: 2023-04-21
Attending: EMERGENCY MEDICINE | Admitting: EMERGENCY MEDICINE

## 2023-04-21 VITALS
RESPIRATION RATE: 15 BRPM | TEMPERATURE: 97.6 F | WEIGHT: 140 LBS | DIASTOLIC BLOOD PRESSURE: 75 MMHG | HEIGHT: 67 IN | BODY MASS INDEX: 21.97 KG/M2 | SYSTOLIC BLOOD PRESSURE: 118 MMHG | OXYGEN SATURATION: 97 % | HEART RATE: 88 BPM

## 2023-04-21 DIAGNOSIS — T14.8XXA CONTUSION: ICD-10-CM
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
## 2023-04-21 RX ORDER — IBUPROFEN 400 MG/1
400 TABLET ORAL ONCE
Status: COMPLETED | OUTPATIENT
Start: 2023-04-21 | End: 2023-04-21

## 2023-04-21 RX ADMIN — IBUPROFEN 400 MG: 400 TABLET, FILM COATED ORAL at 17:23

## 2023-04-21 NOTE — DISCHARGE INSTRUCTIONS
Take ibuprofen for pain  Use ice to any area that is sore for 4-6 times a day for 15 to 20 minutes at a time  Return with any worsening  Your imaging studies have been preliminarily reviewed by the emergency department  Further review by Radiology is pending at this time  If there is a discrepancy or a finding of additional concern identified, we will attempt to contact you at the number you have provided us  If you do not hear from us, follow-up with your primary care provider within 1-2 weeks is always recommended to ensure that all findings were normal or as initially reported  Your results may also be available on [email or web address]    Please also note that sometimes there are subtle abnormalities in your lab values that you may observe when you access your record online  These are frequently not worrisome and if they are of concern we will have discussed them with you  However, we always encourage that you discuss any concerns you may have or observe on your record with your primary care provider  Please also be aware that voice transcription will occasionally recognize words or grammar differently than what was spoken
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
## 2023-04-21 NOTE — Clinical Note
Shante Griffiths was seen and treated in our emergency department on 4/21/2023  Diagnosis:     Chinmay Amaro  may return to work on return date  She may return on this date: 04/24/2023         If you have any questions or concerns, please don't hesitate to call        Rafael Lee DO    ______________________________           _______________          _______________  Hospital Representative                              Date                                Time

## 2025-07-12 ENCOUNTER — HOSPITAL ENCOUNTER (EMERGENCY)
Facility: HOSPITAL | Age: 30
Discharge: HOME | End: 2025-07-12
Attending: EMERGENCY MEDICINE
Payer: COMMERCIAL

## 2025-07-12 ENCOUNTER — APPOINTMENT (EMERGENCY)
Dept: RADIOLOGY | Facility: HOSPITAL | Age: 30
End: 2025-07-12
Payer: COMMERCIAL

## 2025-07-12 VITALS
RESPIRATION RATE: 18 BRPM | DIASTOLIC BLOOD PRESSURE: 68 MMHG | OXYGEN SATURATION: 99 % | BODY MASS INDEX: 28.69 KG/M2 | HEART RATE: 86 BPM | SYSTOLIC BLOOD PRESSURE: 115 MMHG | WEIGHT: 182.76 LBS | TEMPERATURE: 97.2 F | HEIGHT: 67 IN

## 2025-07-12 DIAGNOSIS — S93.401A SPRAIN OF RIGHT ANKLE, UNSPECIFIED LIGAMENT, INITIAL ENCOUNTER: Primary | ICD-10-CM

## 2025-07-12 PROCEDURE — 99284 EMERGENCY DEPT VISIT MOD MDM: CPT | Performed by: EMERGENCY MEDICINE

## 2025-07-12 PROCEDURE — 73630 X-RAY EXAM OF FOOT: CPT

## 2025-07-12 PROCEDURE — 73610 X-RAY EXAM OF ANKLE: CPT

## 2025-07-12 PROCEDURE — 99284 EMERGENCY DEPT VISIT MOD MDM: CPT

## 2025-07-12 RX ORDER — IBUPROFEN 600 MG/1
600 TABLET, FILM COATED ORAL ONCE
Status: COMPLETED | OUTPATIENT
Start: 2025-07-12 | End: 2025-07-12

## 2025-07-12 RX ORDER — IBUPROFEN 600 MG/1
600 TABLET, FILM COATED ORAL EVERY 8 HOURS PRN
Qty: 30 TABLET | Refills: 0 | Status: SHIPPED | OUTPATIENT
Start: 2025-07-12

## 2025-07-12 RX ADMIN — IBUPROFEN 600 MG: 600 TABLET ORAL at 01:07

## 2025-07-12 NOTE — ED PROVIDER NOTES
Time reflects when diagnosis was documented in both MDM as applicable and the Disposition within this note       Time User Action Codes Description Comment    7/12/2025  2:09 AM Estrada James Add [S93.401A] Sprain of right ankle, unspecified ligament, initial encounter           ED Disposition       ED Disposition   Discharge    Condition   Stable    Date/Time   Sat Jul 12, 2025  2:09 AM    Comment   Marisol Quintero discharge to home/self care.                   Assessment & Plan       Medical Decision Making  Patient presented to the emergency department and a MSE was performed. The patient was evaluated for complaint related to acute right ankle pain.  Patient is potentially at risk for, but not limited to, strain, sprain, fracture, dislocation or ligamentous disruption.  Several of these diagnoses have been evaluated and ruled out by history and physical.  As needed, patient will be further evaluated with laboratory and imaging studies.  Higher level diagnostics, such as CT imaging or ultrasound, may also be required.  Please see work-up portion of the note for further evaluation of patient's risk.  Socioeconomic factors were also considered as part of the decision-making process.  Unless otherwise stated in the chart or patient is admitted as elsewhere documented, any previously prescribed medications will be maintained.    Presentation most consistent with acute right ankle sprain.  X-rays negative for fracture or dislocation.  Advised Tylenol and ibuprofen, Aircast, crutches.  Weightbearing to be advanced as tolerated.  Also advised elevation, ice.  Primary care follow-up.  Stable for discharge from the emergency department.  Strict return precautions reviewed.  All questions answered.    Amount and/or Complexity of Data Reviewed  Radiology: ordered and independent interpretation performed.    Risk  Prescription drug management.             Medications   ibuprofen (MOTRIN) tablet 600 mg (600 mg Oral Given  7/12/25 0107)       ED Risk Strat Scores                    No data recorded        SBIRT 20yo+      Flowsheet Row Most Recent Value   Initial Alcohol Screen: US AUDIT-C     1. How often do you have a drink containing alcohol? 0 Filed at: 07/12/2025 0051   2. How many drinks containing alcohol do you have on a typical day you are drinking?  0 Filed at: 07/12/2025 0051   3b. FEMALE Any Age, or MALE 65+: How often do you have 4 or more drinks on one occassion? 0 Filed at: 07/12/2025 0051   Audit-C Score 0 Filed at: 07/12/2025 0051   RIA: How many times in the past year have you...    Used an illegal drug or used a prescription medication for non-medical reasons? Never Filed at: 07/12/2025 0051                            History of Present Illness       Chief Complaint   Patient presents with    Fall     Pt c/o fall at home today down one step. Right ankle pain has not been relieved w/ tylenol.       Past Medical History[1]   Past Surgical History[2]   Family History[3]   Social History[4]   E-Cigarette/Vaping    E-Cigarette Use Never User       E-Cigarette/Vaping Substances    Nicotine No     THC No     CBD No     Flavoring No     Other No     Unknown No       I have reviewed and agree with the history as documented.     Patient presents to the emergency department for evaluation of right foot and ankle injury.  She missed a step this morning going down her steps around 1030.  She inverted her foot.  She complains of pain on the anterior lateral aspect of her right foot.  Has been taking Tylenol without significant improvement.  Able to ambulate on it.  Complains of persistent swelling.  Some tingling in her toes.  Rates her pain at 8 out of 10.  Recently pregnant, but miscarried.  No other complaints, modifying factors, or associated symptoms.        Review of Systems   All other systems reviewed and are negative.          Objective       ED Triage Vitals [07/12/25 0051]   Temperature Pulse Blood Pressure  Respirations SpO2 Patient Position - Orthostatic VS   (!) 97.2 °F (36.2 °C) 86 115/68 18 99 % Lying      Temp Source Heart Rate Source BP Location FiO2 (%) Pain Score    Temporal Monitor Left arm -- 8      Vitals      Date and Time Temp Pulse SpO2 Resp BP Pain Score FACES Pain Rating User   07/12/25 0107 -- -- -- -- -- 3 --    07/12/25 0051 97.2 °F (36.2 °C) 86 99 % 18 115/68 8 -- MD            Physical Exam  Vitals and nursing note reviewed.   Constitutional:       General: She is not in acute distress.     Appearance: Normal appearance. She is well-developed. She is not ill-appearing.   HENT:      Head: Normocephalic and atraumatic.     Eyes:      Conjunctiva/sclera: Conjunctivae normal.       Cardiovascular:      Rate and Rhythm: Normal rate.   Pulmonary:      Effort: Pulmonary effort is normal. No respiratory distress.   Abdominal:      General: There is no distension.     Musculoskeletal:         General: Swelling, tenderness and signs of injury present. No deformity.      Cervical back: Neck supple.      Comments: Limited range of motion right ankle due to pain.  Tender on the anterolateral aspect of the ankle with associated swelling.  No tenderness over the medial or lateral malleolus or at the base of the fifth metatarsal.  Neurovascularly intact distally.     Skin:     General: Skin is warm and dry.      Capillary Refill: Capillary refill takes less than 2 seconds.     Neurological:      General: No focal deficit present.      Mental Status: She is alert and oriented to person, place, and time.     Psychiatric:         Mood and Affect: Mood normal.         Behavior: Behavior normal.         Results Reviewed       None            XR ankle 3+ views RIGHT   ED Interpretation by James Dorado MD (07/12 0203)   No acute fracture or dislocation appreciated.      XR foot 3+ views RIGHT   ED Interpretation by James Dorado MD (07/12 0203)   No acute fracture or dislocation appreciated.           Procedures    ED Medication and Procedure Management   Prior to Admission Medications   Prescriptions Last Dose Informant Patient Reported? Taking?   levothyroxine 125 mcg tablet   Yes No   Sig: Take 125 mcg by mouth daily      Facility-Administered Medications: None     Patient's Medications   Discharge Prescriptions    IBUPROFEN (MOTRIN) 600 MG TABLET    Take 1 tablet (600 mg total) by mouth every 8 (eight) hours as needed for mild pain or moderate pain       Start Date: 7/12/2025 End Date: --       Order Dose: 600 mg       Quantity: 30 tablet    Refills: 0     No discharge procedures on file.  ED SEPSIS DOCUMENTATION   Time reflects when diagnosis was documented in both MDM as applicable and the Disposition within this note       Time User Action Codes Description Comment    7/12/2025  2:09 AM James Dorado Add [S93.401A] Sprain of right ankle, unspecified ligament, initial encounter                      [1]   Past Medical History:  Diagnosis Date    Hypothyroidism     Psoriasis    [2]   Past Surgical History:  Procedure Laterality Date    CHOLECYSTECTOMY LAPAROSCOPIC N/A 1/13/2023    Procedure: CHOLECYSTECTOMY LAPAROSCOPIC;  Surgeon: Rashmi Jeffers DO;  Location:  MAIN OR;  Service: General    FRACTURE SURGERY      TONSILLECTOMY     [3]   Family History  Problem Relation Name Age of Onset    Aneurysm Mother      Hypertension Father     [4]   Social History  Tobacco Use    Smoking status: Every Day     Current packs/day: 0.50     Types: Cigarettes    Smokeless tobacco: Never   Vaping Use    Vaping status: Never Used   Substance Use Topics    Alcohol use: Yes     Comment: socially     Drug use: Yes     Types: Marijuana     Comment: day before surgery        James Dorado MD  07/12/25 8522     No

## (undated) DEVICE — TROCAR: Brand: KII FIOS FIRST ENTRY

## (undated) DEVICE — TUBING INSUFFLATION SET ISO CONNECTOR

## (undated) DEVICE — TROCAR: Brand: KII SLEEVE

## (undated) DEVICE — SUT VICRYL 0 REEL 54 IN J287G

## (undated) DEVICE — ALLENTOWN LAP CHOLE APP PACK: Brand: CARDINAL HEALTH

## (undated) DEVICE — CHLORAPREP HI-LITE 26ML ORANGE

## (undated) DEVICE — SUT VICRYL 4-0 PS-2 27 IN J426H

## (undated) DEVICE — GLOVE SRG BIOGEL 6

## (undated) DEVICE — ELECTRODE LAP J HOOK E-Z CLEAN 33CM-0021

## (undated) DEVICE — PMI DISPOSABLE PUNCTURE CLOSURE DEVICE / SUTURE GRASPER: Brand: PMI

## (undated) DEVICE — GLOVE INDICATOR PI UNDERGLOVE SZ 6.5 BLUE

## (undated) DEVICE — VISUALIZATION SYSTEM: Brand: CLEARIFY

## (undated) DEVICE — INTENDED FOR TISSUE SEPARATION, AND OTHER PROCEDURES THAT REQUIRE A SHARP SURGICAL BLADE TO PUNCTURE OR CUT.: Brand: BARD-PARKER SAFETY BLADES SIZE 11, STERILE

## (undated) DEVICE — LIGACLIP 10-M/L, 10MM ENDOSCOPIC ROTATING MULTIPLE CLIP APPLIERS: Brand: LIGACLIP

## (undated) DEVICE — ENDOPATH PNEUMONEEDLE INSUFFLATION NEEDLES WITH LUER LOCK CONNECTORS 120MM: Brand: ENDOPATH

## (undated) DEVICE — ENDOPOUCH RETRIEVER SPECIMEN RETRIEVAL BAGS: Brand: ENDOPOUCH RETRIEVER

## (undated) DEVICE — ADHESIVE SKIN HIGH VISCOSITY EXOFIN 1ML

## (undated) DEVICE — SINGLE PORT MANIFOLD: Brand: NEPTUNE 2

## (undated) DEVICE — PAD GROUNDING ADULT

## (undated) DEVICE — PENCIL ELECTROSURG E-Z CLEAN -0035H

## (undated) DEVICE — VIAL DECANTER